# Patient Record
Sex: FEMALE | Race: WHITE | NOT HISPANIC OR LATINO | URBAN - METROPOLITAN AREA
[De-identification: names, ages, dates, MRNs, and addresses within clinical notes are randomized per-mention and may not be internally consistent; named-entity substitution may affect disease eponyms.]

---

## 2024-04-04 ENCOUNTER — LAB VISIT (OUTPATIENT)
Dept: LAB | Facility: HOSPITAL | Age: 24
End: 2024-04-04
Attending: FAMILY MEDICINE
Payer: COMMERCIAL

## 2024-04-04 ENCOUNTER — OFFICE VISIT (OUTPATIENT)
Dept: FAMILY MEDICINE | Facility: CLINIC | Age: 24
End: 2024-04-04
Payer: COMMERCIAL

## 2024-04-04 ENCOUNTER — TELEPHONE (OUTPATIENT)
Dept: DIABETES | Facility: CLINIC | Age: 24
End: 2024-04-04
Payer: COMMERCIAL

## 2024-04-04 VITALS
DIASTOLIC BLOOD PRESSURE: 83 MMHG | HEIGHT: 63 IN | SYSTOLIC BLOOD PRESSURE: 127 MMHG | OXYGEN SATURATION: 97 % | WEIGHT: 141.44 LBS | BODY MASS INDEX: 25.06 KG/M2 | TEMPERATURE: 99 F | HEART RATE: 105 BPM

## 2024-04-04 DIAGNOSIS — E10.22 TYPE 1 DIABETES MELLITUS WITH STAGE 2 CHRONIC KIDNEY DISEASE: ICD-10-CM

## 2024-04-04 DIAGNOSIS — N18.2 TYPE 1 DIABETES MELLITUS WITH STAGE 2 CHRONIC KIDNEY DISEASE: Primary | ICD-10-CM

## 2024-04-04 DIAGNOSIS — E10.22 TYPE 1 DIABETES MELLITUS WITH STAGE 2 CHRONIC KIDNEY DISEASE: Primary | ICD-10-CM

## 2024-04-04 DIAGNOSIS — N18.2 TYPE 1 DIABETES MELLITUS WITH STAGE 2 CHRONIC KIDNEY DISEASE: ICD-10-CM

## 2024-04-04 LAB
ANION GAP SERPL CALC-SCNC: 11 MMOL/L (ref 8–16)
BUN SERPL-MCNC: 12 MG/DL (ref 6–20)
CALCIUM SERPL-MCNC: 9.9 MG/DL (ref 8.7–10.5)
CHLORIDE SERPL-SCNC: 102 MMOL/L (ref 95–110)
CO2 SERPL-SCNC: 23 MMOL/L (ref 23–29)
CREAT SERPL-MCNC: 0.8 MG/DL (ref 0.5–1.4)
EST. GFR  (NO RACE VARIABLE): >60 ML/MIN/1.73 M^2
ESTIMATED AVG GLUCOSE: 263 MG/DL (ref 68–131)
GLUCOSE SERPL-MCNC: 276 MG/DL (ref 70–110)
HBA1C MFR BLD: 10.8 % (ref 4–5.6)
POTASSIUM SERPL-SCNC: 4.9 MMOL/L (ref 3.5–5.1)
SODIUM SERPL-SCNC: 136 MMOL/L (ref 136–145)

## 2024-04-04 PROCEDURE — 99999 PR PBB SHADOW E&M-NEW PATIENT-LVL V: CPT | Mod: PBBFAC,,, | Performed by: FAMILY MEDICINE

## 2024-04-04 PROCEDURE — 1159F MED LIST DOCD IN RCRD: CPT | Mod: CPTII,S$GLB,, | Performed by: FAMILY MEDICINE

## 2024-04-04 PROCEDURE — 3074F SYST BP LT 130 MM HG: CPT | Mod: CPTII,S$GLB,, | Performed by: FAMILY MEDICINE

## 2024-04-04 PROCEDURE — 4010F ACE/ARB THERAPY RXD/TAKEN: CPT | Mod: CPTII,S$GLB,, | Performed by: FAMILY MEDICINE

## 2024-04-04 PROCEDURE — 99204 OFFICE O/P NEW MOD 45 MIN: CPT | Mod: S$GLB,,, | Performed by: FAMILY MEDICINE

## 2024-04-04 PROCEDURE — 36415 COLL VENOUS BLD VENIPUNCTURE: CPT | Mod: PO | Performed by: FAMILY MEDICINE

## 2024-04-04 PROCEDURE — 80048 BASIC METABOLIC PNL TOTAL CA: CPT | Performed by: FAMILY MEDICINE

## 2024-04-04 PROCEDURE — 3079F DIAST BP 80-89 MM HG: CPT | Mod: CPTII,S$GLB,, | Performed by: FAMILY MEDICINE

## 2024-04-04 PROCEDURE — 3046F HEMOGLOBIN A1C LEVEL >9.0%: CPT | Mod: CPTII,S$GLB,, | Performed by: FAMILY MEDICINE

## 2024-04-04 PROCEDURE — 3008F BODY MASS INDEX DOCD: CPT | Mod: CPTII,S$GLB,, | Performed by: FAMILY MEDICINE

## 2024-04-04 PROCEDURE — 83036 HEMOGLOBIN GLYCOSYLATED A1C: CPT | Performed by: FAMILY MEDICINE

## 2024-04-04 RX ORDER — LOSARTAN POTASSIUM 25 MG/1
25 TABLET ORAL DAILY
COMMUNITY

## 2024-04-04 RX ORDER — ATORVASTATIN CALCIUM 80 MG/1
40 TABLET, FILM COATED ORAL DAILY
COMMUNITY
Start: 2023-07-23

## 2024-04-04 RX ORDER — INSULIN LISPRO 100 [IU]/ML
INJECTION, SOLUTION INTRAVENOUS; SUBCUTANEOUS
COMMUNITY
Start: 2023-10-25 | End: 2024-05-16 | Stop reason: SDUPTHER

## 2024-04-04 RX ORDER — PEN NEEDLE, DIABETIC 32GX 5/32"
NEEDLE, DISPOSABLE MISCELLANEOUS
COMMUNITY
Start: 2024-01-18

## 2024-04-04 RX ORDER — ERGOCALCIFEROL 1.25 MG/1
50000 CAPSULE ORAL
COMMUNITY

## 2024-04-04 RX ORDER — METOPROLOL SUCCINATE 50 MG/1
50 TABLET, EXTENDED RELEASE ORAL 2 TIMES DAILY
COMMUNITY

## 2024-04-04 RX ORDER — GLUCAGON 3 MG/1
POWDER NASAL
COMMUNITY

## 2024-04-04 RX ORDER — CETIRIZINE HYDROCHLORIDE 10 MG/1
1 TABLET ORAL EVERY MORNING
COMMUNITY

## 2024-04-04 RX ORDER — BLOOD-GLUCOSE SENSOR
EACH MISCELLANEOUS
COMMUNITY
Start: 2024-01-18 | End: 2024-05-27

## 2024-04-04 RX ORDER — GLUCOSAM/CHON-MSM1/C/MANG/BOSW 500-416.6
TABLET ORAL
COMMUNITY
Start: 2024-02-11

## 2024-04-04 RX ORDER — INSULIN GLARGINE 100 [IU]/ML
26 INJECTION, SOLUTION SUBCUTANEOUS DAILY
COMMUNITY
Start: 2024-01-18 | End: 2024-05-15 | Stop reason: SDUPTHER

## 2024-04-04 RX ORDER — LANSOPRAZOLE 30 MG/1
30 CAPSULE, DELAYED RELEASE ORAL DAILY
COMMUNITY
Start: 2024-01-18 | End: 2024-07-16

## 2024-04-04 RX ORDER — LANCETS
EACH MISCELLANEOUS
COMMUNITY
Start: 2024-01-18

## 2024-04-04 NOTE — TELEPHONE ENCOUNTER
Spoke with patients mother to assist with scheduling a new patient appointment with diabetes management

## 2024-04-04 NOTE — PROGRESS NOTES
"Subjective:       Patient ID: Gracie Irizarry is a 24 y.o. female.    Chief Complaint: Establish Care      HPI Comments:       Current Outpatient Medications:     atorvastatin (LIPITOR) 80 MG tablet, Take 40 mg by mouth once daily., Disp: , Rfl:     BAQSIMI 3 mg/actuation Spry, SMARTSI Spray(s) Both Nares PRN, Disp: , Rfl:     BD NAZIA 2ND GEN PEN NEEDLE 32 gauge x 5/32" Ndle, Use needles for insulin administration., Disp: , Rfl:     blood sugar diagnostic Strp, True metrix test strips. Tests 5 times daily, Disp: , Rfl:     blood-glucose sensor (DEXCOM G7 SENSOR) Nadia, Please use sensor wit Dexcom CGM., Disp: , Rfl:     cetirizine (ZYRTEC) 10 MG tablet, Take 1 tablet by mouth every morning., Disp: , Rfl:     ergocalciferol (ERGOCALCIFEROL) 50,000 unit Cap, Take 50,000 Units by mouth every 7 days., Disp: , Rfl:     HUMALOG KWIKPEN INSULIN 100 unit/mL pen, PLEASE SEE ATTACHED FOR DETAILED DIRECTIONS, Disp: , Rfl:     lancets Misc, ONE TOUCH ULTRA LANCETS, TESTS 5 TIMES DAILY, Disp: , Rfl:     lansoprazole (PREVACID) 30 MG capsule, Take 30 mg by mouth once daily., Disp: , Rfl:     LANTUS SOLOSTAR U-100 INSULIN glargine 100 units/mL SubQ pen, Inject 26 Units into the skin once daily., Disp: , Rfl:     losartan (COZAAR) 25 MG tablet, Take 25 mg by mouth once daily., Disp: , Rfl:     metoprolol succinate (TOPROL-XL) 50 MG 24 hr tablet, Take 50 mg by mouth 2 (two) times daily., Disp: , Rfl:     TRUEPLUS LANCETS 30 gauge Misc, TESTS 5 TIMES DAILY, Disp: , Rfl:       Here to establish care.  Accompanied by her mother.  Lives nearby    Some recent disruption in her care due to insurance problems.  She is now on her mother's insurance.  Does not have anybody in diabetes care that is following her currently.  Needs some Dexcom parts to get back to fully monitoring her sugar.  Currently her blood sugar runs in the 100s but sometimes up to 479.  Recent admission for DKA back in February    She feels well.  No polydipsia , " "polyuria.    Histor of subdural hematoma found in October '23.  No residual neurologic symptoms or headaches    Status post cholecystectomy.  Appetite good      Review of Systems   Constitutional:  Negative for activity change, appetite change and fever.   HENT:  Negative for sore throat.    Respiratory:  Negative for cough and shortness of breath.    Cardiovascular:  Negative for chest pain.   Gastrointestinal:  Negative for abdominal pain, diarrhea and nausea.   Endocrine: Negative for polydipsia and polyphagia.   Genitourinary:  Negative for difficulty urinating.   Musculoskeletal:  Negative for arthralgias and myalgias.   Neurological:  Negative for dizziness and headaches.       Objective:      Vitals:    04/04/24 1326   BP: 127/83   Pulse: 105   Temp: 99.1 °F (37.3 °C)   SpO2: 97%   Weight: 64.1 kg (141 lb 6.8 oz)   Height: 5' 3" (1.6 m)   PainSc: 0-No pain     Physical Exam  Vitals and nursing note reviewed.   Constitutional:       General: She is not in acute distress.     Appearance: She is well-developed. She is not diaphoretic.   HENT:      Head: Normocephalic.   Neck:      Thyroid: No thyromegaly.   Cardiovascular:      Rate and Rhythm: Normal rate and regular rhythm.      Heart sounds: Normal heart sounds. No murmur heard.  Pulmonary:      Effort: Pulmonary effort is normal.      Breath sounds: Normal breath sounds. No wheezing or rales.   Abdominal:      General: There is no distension.      Palpations: Abdomen is soft.   Musculoskeletal:      Cervical back: Neck supple.   Lymphadenopathy:      Cervical: No cervical adenopathy.   Skin:     General: Skin is warm and dry.   Neurological:      Mental Status: She is alert and oriented to person, place, and time.   Psychiatric:         Mood and Affect: Mood normal.         Behavior: Behavior normal.         Thought Content: Thought content normal.         Judgment: Judgment normal.         Assessment:       1. Type 1 diabetes mellitus with stage 2 chronic " kidney disease        Plan:   Type 1 diabetes mellitus with stage 2 chronic kidney disease  Comments:  Continue current meds.  Diabetes care consult.  Follow-up 3 months  Orders:  -     Ambulatory referral/consult to Diabetic Advanced Practice Providers (Medical Management); Future; Expected date: 04/11/2024  -     Basic Metabolic Panel; Future; Expected date: 04/04/2024  -     Hemoglobin A1C; Future; Expected date: 04/04/2024

## 2024-04-10 DIAGNOSIS — E11.9 TYPE 2 DIABETES MELLITUS WITHOUT COMPLICATION: ICD-10-CM

## 2024-05-14 ENCOUNTER — PATIENT MESSAGE (OUTPATIENT)
Dept: FAMILY MEDICINE | Facility: CLINIC | Age: 24
End: 2024-05-14
Payer: COMMERCIAL

## 2024-05-14 RX ORDER — INSULIN LISPRO 100 [IU]/ML
INJECTION, SOLUTION INTRAVENOUS; SUBCUTANEOUS
Refills: 0 | OUTPATIENT
Start: 2024-05-14

## 2024-05-14 NOTE — TELEPHONE ENCOUNTER
Refill Decision Note   Gracie Irizarry  is requesting a refill authorization.  Brief Assessment and Rationale for Refill:  Quick Discontinue     Medication Therapy Plan:   . Pharmacy is requesting new scripts for the following medications without required information, (sig/ frequency/qty/etc)          Comments: Pharmacies have been requesting medications for patients without required information, (sig, frequency, qty, etc.). In addition, requests are sent for medication(s) pt. are currently not taking, and medications patients have never taken.    We have spoken to the pharmacies about these request types and advised their teams previously that we are unable to assess these New Script requests and require all details for these requests. This is a known issue and has been reported.     Note composed:6:12 PM 05/14/2024

## 2024-05-14 NOTE — TELEPHONE ENCOUNTER
No care due was identified.  [facility] Embedded Care Due Messages. Reference number: [id number].   5/14/2024 5:31:03 PM CDT

## 2024-05-15 DIAGNOSIS — N18.2 TYPE 1 DIABETES MELLITUS WITH STAGE 2 CHRONIC KIDNEY DISEASE: ICD-10-CM

## 2024-05-15 DIAGNOSIS — E10.22 TYPE 1 DIABETES MELLITUS WITH STAGE 2 CHRONIC KIDNEY DISEASE: ICD-10-CM

## 2024-05-15 RX ORDER — INSULIN GLARGINE 100 [IU]/ML
26 INJECTION, SOLUTION SUBCUTANEOUS DAILY
Qty: 3 ML | Refills: 3 | Status: SHIPPED | OUTPATIENT
Start: 2024-05-15

## 2024-05-16 DIAGNOSIS — N18.2 TYPE 1 DIABETES MELLITUS WITH STAGE 2 CHRONIC KIDNEY DISEASE: ICD-10-CM

## 2024-05-16 DIAGNOSIS — E10.22 TYPE 1 DIABETES MELLITUS WITH STAGE 2 CHRONIC KIDNEY DISEASE: ICD-10-CM

## 2024-05-16 RX ORDER — INSULIN LISPRO 100 [IU]/ML
INJECTION, SOLUTION INTRAVENOUS; SUBCUTANEOUS
Qty: 3 ML | Refills: 5 | Status: SHIPPED | OUTPATIENT
Start: 2024-05-16 | End: 2024-05-17

## 2024-05-16 NOTE — TELEPHONE ENCOUNTER
No care due was identified.  [facility] Embedded Care Due Messages. Reference number: [id number].   5/16/2024 2:08:41 PM CDT

## 2024-05-17 DIAGNOSIS — N18.2 TYPE 1 DIABETES MELLITUS WITH STAGE 2 CHRONIC KIDNEY DISEASE: ICD-10-CM

## 2024-05-17 DIAGNOSIS — E10.22 TYPE 1 DIABETES MELLITUS WITH STAGE 2 CHRONIC KIDNEY DISEASE: ICD-10-CM

## 2024-05-17 RX ORDER — INSULIN LISPRO 100 [IU]/ML
INJECTION, SOLUTION INTRAVENOUS; SUBCUTANEOUS
Qty: 15 EACH | Refills: 5 | Status: SHIPPED | OUTPATIENT
Start: 2024-05-17 | End: 2024-06-17

## 2024-05-17 NOTE — TELEPHONE ENCOUNTER
Refill Routing Note   Medication(s) are not appropriate for processing by [facility] for the following reason(s):        Outside of protocol    [facility] action(s):  Route        Medication Therapy Plan: Insulin on a sliding scale is outside of [facility] protocol      Appointments  past 12m or future 3m with PCP    Date Provider   Last Visit   4/4/2024 Kobi Martin MD   Next Visit   7/5/2024 Kobi Martin MD   ED visits in past 90 days: 0        Note composed:9:56 PM 05/16/2024

## 2024-05-17 NOTE — TELEPHONE ENCOUNTER
Spoke with pharmacy they stated patient prescription was not sent in correctly and need a new prescription.

## 2024-05-17 NOTE — TELEPHONE ENCOUNTER
----- Message from Royce Moran sent at 5/17/2024  4:27 PM CDT -----  Contact: Victorina / EDY Prajapati is calling to clarify the script for Jovanna Oliveira. Please call Victorina at  [phone].

## 2024-05-17 NOTE — TELEPHONE ENCOUNTER
No care due was identified.  Health [facility] Embedded Care Due Messages. Reference number: [id number].   5/17/2024 4:49:36 PM CDT

## 2024-05-19 RX ORDER — INSULIN LISPRO 100 [IU]/ML
INJECTION, SOLUTION INTRAVENOUS; SUBCUTANEOUS
Qty: 15 EACH | Refills: 5 | OUTPATIENT
Start: 2024-05-19

## 2024-05-20 ENCOUNTER — TELEPHONE (OUTPATIENT)
Dept: FAMILY MEDICINE | Facility: CLINIC | Age: 24
End: 2024-05-20
Payer: COMMERCIAL

## 2024-05-20 NOTE — TELEPHONE ENCOUNTER
----- Message from Kobi Martin MD sent at 5/20/2024  8:32 AM CDT -----  Contact: Victorina / EDY  Please see if you can get this resolved  ----- Message -----  From: Danuta Lacey CMA  Sent: 5/17/2024   4:46 PM CDT  To: Kobi Martin MD    Prescription needs more clarification pharmacy will not refill.  ----- Message -----  From: Royce Moran  Sent: 5/17/2024   4:28 PM CDT  To: Veronica Prajapati is calling to clarify the script for Humalog Pen. Please call Victorina at  [phone].

## 2024-05-20 NOTE — TELEPHONE ENCOUNTER
Called pharmacy to clarify medication was not able to due to no information specified regarding that. Called pt to clarify medication unit. Patient did not answer left a voicemail to call back.

## 2024-05-21 NOTE — TELEPHONE ENCOUNTER
Spoke to Olamide at the pharmacy and gave dosage for humalog per pt message and she verbalized understanding

## 2024-05-24 ENCOUNTER — OFFICE VISIT (OUTPATIENT)
Dept: FAMILY MEDICINE | Facility: CLINIC | Age: 24
End: 2024-05-24
Payer: COMMERCIAL

## 2024-05-24 VITALS
OXYGEN SATURATION: 97 % | SYSTOLIC BLOOD PRESSURE: 130 MMHG | WEIGHT: 137.38 LBS | BODY MASS INDEX: 24.33 KG/M2 | TEMPERATURE: 98 F | HEART RATE: 115 BPM | DIASTOLIC BLOOD PRESSURE: 88 MMHG

## 2024-05-24 DIAGNOSIS — N18.2 TYPE 1 DIABETES MELLITUS WITH STAGE 2 CHRONIC KIDNEY DISEASE: ICD-10-CM

## 2024-05-24 DIAGNOSIS — E10.22 TYPE 1 DIABETES MELLITUS WITH STAGE 2 CHRONIC KIDNEY DISEASE: ICD-10-CM

## 2024-05-24 DIAGNOSIS — L98.499 SKIN ULCER, UNSPECIFIED ULCER STAGE: Primary | ICD-10-CM

## 2024-05-24 LAB
ALBUMIN/CREAT UR: 26.5 UG/MG (ref 0–30)
CREAT UR-MCNC: 49 MG/DL (ref 15–325)
MICROALBUMIN UR DL<=1MG/L-MCNC: 13 UG/ML

## 2024-05-24 PROCEDURE — 99999 PR PBB SHADOW E&M-EST. PATIENT-LVL V: CPT | Mod: PBBFAC,,, | Performed by: FAMILY MEDICINE

## 2024-05-24 PROCEDURE — 4010F ACE/ARB THERAPY RXD/TAKEN: CPT | Mod: CPTII,S$GLB,, | Performed by: FAMILY MEDICINE

## 2024-05-24 PROCEDURE — 82043 UR ALBUMIN QUANTITATIVE: CPT | Performed by: FAMILY MEDICINE

## 2024-05-24 PROCEDURE — 3008F BODY MASS INDEX DOCD: CPT | Mod: CPTII,S$GLB,, | Performed by: FAMILY MEDICINE

## 2024-05-24 PROCEDURE — 3079F DIAST BP 80-89 MM HG: CPT | Mod: CPTII,S$GLB,, | Performed by: FAMILY MEDICINE

## 2024-05-24 PROCEDURE — 99214 OFFICE O/P EST MOD 30 MIN: CPT | Mod: S$GLB,,, | Performed by: FAMILY MEDICINE

## 2024-05-24 PROCEDURE — 3075F SYST BP GE 130 - 139MM HG: CPT | Mod: CPTII,S$GLB,, | Performed by: FAMILY MEDICINE

## 2024-05-24 PROCEDURE — 3046F HEMOGLOBIN A1C LEVEL >9.0%: CPT | Mod: CPTII,S$GLB,, | Performed by: FAMILY MEDICINE

## 2024-05-24 PROCEDURE — 1159F MED LIST DOCD IN RCRD: CPT | Mod: CPTII,S$GLB,, | Performed by: FAMILY MEDICINE

## 2024-05-24 NOTE — PROGRESS NOTES
"Subjective:       Patient ID: Gracie Irizarry is a 24 y.o. female.    Chief Complaint: Wound Check      HPI Comments:       Current Outpatient Medications:     atorvastatin (LIPITOR) 80 MG tablet, Take 40 mg by mouth once daily., Disp: , Rfl:     BAQSIMI 3 mg/actuation Spry, SMARTSI Spray(s) Both Nares PRN, Disp: , Rfl:     BD NAZIA 2ND GEN PEN NEEDLE 32 gauge x 5/32" Ndle, Use needles for insulin administration., Disp: , Rfl:     blood sugar diagnostic Strp, True metrix test strips. Tests 5 times daily, Disp: , Rfl:     blood-glucose sensor (DEXCOM G7 SENSOR) Nadia, Please use sensor wit Dexcom CGM., Disp: , Rfl:     cetirizine (ZYRTEC) 10 MG tablet, Take 1 tablet by mouth every morning., Disp: , Rfl:     ergocalciferol (ERGOCALCIFEROL) 50,000 unit Cap, Take 50,000 Units by mouth every 7 days., Disp: , Rfl:     HUMALOG KWIKPEN INSULIN 100 unit/mL pen, TO BE USED BY SLIDING SCALE 3 TIMES A DAY FOR GLUCOSE CONTROL, Disp: 15 each, Rfl: 5    lancets Misc, ONE TOUCH ULTRA LANCETS, TESTS 5 TIMES DAILY, Disp: , Rfl:     lansoprazole (PREVACID) 30 MG capsule, Take 30 mg by mouth once daily., Disp: , Rfl:     LANTUS SOLOSTAR U-100 INSULIN 100 unit/mL (3 mL) InPn pen, Inject 26 Units into the skin once daily., Disp: 3 mL, Rfl: 3    losartan (COZAAR) 25 MG tablet, Take 25 mg by mouth once daily., Disp: , Rfl:     metoprolol succinate (TOPROL-XL) 50 MG 24 hr tablet, Take 50 mg by mouth 2 (two) times daily., Disp: , Rfl:     TRUEPLUS LANCETS 30 gauge Misc, TESTS 5 TIMES DAILY, Disp: , Rfl:       Shoes your rubbing on her ankle and she developed a blister a few days ago.  It has since drained and now she has a ulcer with an eschar.  No pain.  No fever chills.  No drainage or fluctuation    Blood pressure has been high in the past.  She is on losartan for proteinuria    Needs a gynecologist for Pap smear.      Review of Systems   Constitutional:  Negative for activity change, appetite change and fever.   HENT:  Negative for sore " throat.    Respiratory:  Negative for cough and shortness of breath.    Cardiovascular:  Negative for chest pain.   Gastrointestinal:  Negative for abdominal pain, diarrhea and nausea.   Genitourinary:  Negative for difficulty urinating.   Musculoskeletal:  Negative for arthralgias and myalgias.   Skin:  Positive for wound.   Neurological:  Negative for dizziness and headaches.       Objective:      Vitals:    05/24/24 0805 05/24/24 0814   BP: (!) 141/94 130/88   Pulse: (!) 115    Temp: 98.3 °F (36.8 °C)    TempSrc: Oral    SpO2: 97%    Weight: 62.3 kg (137 lb 5.6 oz)    PainSc: 0-No pain      Physical Exam  Constitutional:       Appearance: She is not ill-appearing.   HENT:      Head: Normocephalic.   Cardiovascular:      Rate and Rhythm: Tachycardia present.   Pulmonary:      Effort: Pulmonary effort is normal. No respiratory distress.   Musculoskeletal:      Cervical back: Neck supple.      Right lower leg: No edema.      Left lower leg: No edema.        Feet:    Feet:      Comments: Shallow ulcer with eschar.  No fluctuation or induration.  No erythema or tenderness  Neurological:      Mental Status: She is alert and oriented to person, place, and time.   Psychiatric:         Mood and Affect: Mood normal.         Behavior: Behavior normal.         Thought Content: Thought content normal.         Judgment: Judgment normal.         Assessment:       1. Skin ulcer, unspecified ulcer stage    2. Type 1 diabetes mellitus with stage 2 chronic kidney disease        Plan:   Skin ulcer, unspecified ulcer stage  Comments:  Wound care clinic consult  Orders:  -     Ambulatory referral/consult to Wound Clinic; Future; Expected date: 05/31/2024    Type 1 diabetes mellitus with stage 2 chronic kidney disease  Comments:  Uncontrolled.  Sees diabetes next week.  Podiatry consult for foot exam  Orders:  -     Ambulatory referral/consult to Podiatry; Future; Expected date: 05/31/2024  -     Microalbumin/Creatinine Ratio, Urine;  Future; Expected date: 05/24/2024  -     Ambulatory referral/consult to Gynecology; Future; Expected date: 05/31/2024

## 2024-05-27 ENCOUNTER — OFFICE VISIT (OUTPATIENT)
Dept: DIABETES | Facility: CLINIC | Age: 24
End: 2024-05-27
Payer: COMMERCIAL

## 2024-05-27 DIAGNOSIS — I10 ESSENTIAL HYPERTENSION: ICD-10-CM

## 2024-05-27 DIAGNOSIS — E10.69 DYSLIPIDEMIA DUE TO TYPE 1 DIABETES MELLITUS: ICD-10-CM

## 2024-05-27 DIAGNOSIS — E10.21 DIABETIC NEPHROPATHY ASSOCIATED WITH TYPE 1 DIABETES MELLITUS: ICD-10-CM

## 2024-05-27 DIAGNOSIS — K21.9 GASTROESOPHAGEAL REFLUX DISEASE WITHOUT ESOPHAGITIS: ICD-10-CM

## 2024-05-27 DIAGNOSIS — E78.5 DYSLIPIDEMIA DUE TO TYPE 1 DIABETES MELLITUS: ICD-10-CM

## 2024-05-27 DIAGNOSIS — N18.2 TYPE 1 DIABETES MELLITUS WITH STAGE 2 CHRONIC KIDNEY DISEASE: ICD-10-CM

## 2024-05-27 DIAGNOSIS — E10.22 TYPE 1 DIABETES MELLITUS WITH STAGE 2 CHRONIC KIDNEY DISEASE: ICD-10-CM

## 2024-05-27 DIAGNOSIS — E10.65 TYPE 1 DIABETES MELLITUS WITH HYPERGLYCEMIA: Primary | ICD-10-CM

## 2024-05-27 DIAGNOSIS — E34.8: ICD-10-CM

## 2024-05-27 PROBLEM — E10.319 DIABETIC RETINOPATHY ASSOCIATED WITH TYPE 1 DIABETES MELLITUS: Status: ACTIVE | Noted: 2018-05-18

## 2024-05-27 PROBLEM — E10.11 DIABETIC KETOACIDOSIS WITH COMA ASSOCIATED WITH TYPE 1 DIABETES MELLITUS: Status: ACTIVE | Noted: 2018-11-28

## 2024-05-27 PROCEDURE — 3061F NEG MICROALBUMINURIA REV: CPT | Mod: CPTII,95,, | Performed by: NURSE PRACTITIONER

## 2024-05-27 PROCEDURE — 3066F NEPHROPATHY DOC TX: CPT | Mod: CPTII,95,, | Performed by: NURSE PRACTITIONER

## 2024-05-27 PROCEDURE — 99204 OFFICE O/P NEW MOD 45 MIN: CPT | Mod: 95,,, | Performed by: NURSE PRACTITIONER

## 2024-05-27 PROCEDURE — 4010F ACE/ARB THERAPY RXD/TAKEN: CPT | Mod: CPTII,95,, | Performed by: NURSE PRACTITIONER

## 2024-05-27 PROCEDURE — 3046F HEMOGLOBIN A1C LEVEL >9.0%: CPT | Mod: CPTII,95,, | Performed by: NURSE PRACTITIONER

## 2024-05-27 RX ORDER — BLOOD-GLUCOSE SENSOR
1 EACH MISCELLANEOUS
Qty: 3 EACH | Refills: 11 | Status: SHIPPED | OUTPATIENT
Start: 2024-05-27 | End: 2024-06-17

## 2024-05-27 NOTE — PROGRESS NOTES
Telemedicine Visit    The patient location is home  The chief complaint leading to consultation is: Diabetes    Visit type: audiovisual    Face to Face time with patient: 30  40 minutes of total time spent on the encounter, which includes face to face time and non-face to face time preparing to see the patient (eg, review of tests), Obtaining and/or reviewing separately obtained history, Documenting clinical information in the electronic or other health record, Independently interpreting results (not separately reported) and communicating results to the patient/family/caregiver, or Care coordination (not separately reported).  Each patient to whom he or she provides medical services by telemedicine is:  (1) informed of the relationship between the physician and patient and the respective role of any other health care provider with respect to management of the patient; and (2) notified that he or she may decline to receive medical services by telemedicine and may withdraw from such care at any time.  Notes:         Gracie Irizarry is a 24 y.o. female who  has a past medical history of Diabetes mellitus type I and GERD (gastroesophageal reflux disease)., who present for an initial evaluation of Type 1 diabetes mellitus.     CHIEF COMPLAINT: Diabetes Consultation    PCP: Kobi Martin MD     The patient was initially diagnosed with diabetes at age 7.   Followed by Dr. Jayshree barnett prior to aging out of peds clinic.   Patient seen once by Dr. Jasmine at [facility] in February of 2023 which was last encounter with an endocrinologist.   Numerous admission for DKA.   Never been on a insulin pump.    Previous failed treatments include:  Dexcom G6 CGM - was upgraded to G7, then fell out of care with last provider. Has not had in some time.    Social Documentation:  Patient lives in [place] with mom and dad.   Occupation: works at a day care. Working 7am-6pm M-F.  Exercise:     Current monitoring regimen:  "capillary blood glucose monitoring with finger sticks.   Currently checking before meals and at bedtime, occasionally at 3 am if she wakes up.   Overall elevated, but states will stabilize out 150-200 before lunch.     Current Diabetes related symptoms/problems include hyperglycemia and have been unchanged.     Diabetes related complications:   nephropathy, retinopathy, and autonomic neuropathy, Mauriac syndrome  denies Pancreatitis  denies Gastroparesis  reports DKA  denies Hx/family Hx of MEN2/MTC  denies Frequent UTIs/yeast infections    Cardiovascular Risk Factors: dyslipidemia, hypertension, and microalbuminuria.    Any episodes of hypoglycemia?  Yes. Hypoglycemia treatment reviewed with patient and education to be provided in AVS.   Hypoglycemia Unawareness? No  Severe hypoglycemia requiring 3rd party? Yes - once at age 10.     Last visit with Diabetes Education: none in last year.    Diabetes Medications               BAQSIMI 3 mg/actuation Spry SMARTSI Spray(s) Both Nares PRN    HUMALOG KWIKPEN INSULIN 100 unit/mL pen TO BE USED BY SLIDING SCALE 3 TIMES A DAY FOR GLUCOSE CONTROL - Target 150, ISF 40. Set dose of 6 units plus correction with meals.     LANTUS SOLOSTAR U-100 INSULIN 100 unit/mL (3 mL) InPn pen Inject 26 Units into the skin once daily. - Taking 26 units qhs.      DIABETES DISEASE MANAGEMENT STATUS  Statin: Taking  ACE/ARB: Taking  Screening or Prevention Patient's value Goal Complete/Controlled?   HgA1C Testing and Control   Lab Results   Component Value Date    HGBA1C 10.8 (H) 2024      Annually/Less than 8% No   Lipid profile : 2023 Annually No   LDL control Lab Results   Component Value Date    LDLCALC 81 2023    Annually/Less than 100 mg/dl  Yes   Nephropathy screening Lab Results   Component Value Date    LABMICR 13.0 2024     No results found for: "PROTEINUA"  No results found for: "UTPCR"   Annually Yes   Blood pressure BP Readings from Last 1 Encounters: " "  05/24/24 130/88    Less than 140/90 Yes   Dilated retinal exam : 09/23/2022 Annually No   Foot exam   Most Recent Foot Exam Date: Not Found Annually No   Patient's medications, allergies, past medical, surgical, social and family histories were reviewed and updated as appropriate.     Review of Systems   Constitutional:  Negative for weight loss.   Eyes:  Negative for blurred vision and double vision.   Cardiovascular:  Negative for chest pain.   Gastrointestinal:  Negative for nausea and vomiting.   Genitourinary:  Negative for frequency.   Musculoskeletal:  Negative for falls.   Neurological:  Negative for dizziness and weakness.   Endo/Heme/Allergies:  Negative for polydipsia.   Psychiatric/Behavioral:  Negative for depression.    All other systems reviewed and are negative.       Physical Exam  Constitutional:       Appearance: Normal appearance.   HENT:      Head: Normocephalic and atraumatic.   Pulmonary:      Effort: No respiratory distress.   Musculoskeletal:      Cervical back: Normal range of motion.   Neurological:      Mental Status: She is alert and oriented to person, place, and time.   Psychiatric:         Mood and Affect: Mood normal.         Behavior: Behavior normal.        Last menstrual period 03/31/2024.  Wt Readings from Last 3 Encounters:   05/24/24 62.3 kg (137 lb 5.6 oz)   04/04/24 64.1 kg (141 lb 6.8 oz)       LAB REVIEW  Lab Results   Component Value Date     04/04/2024    K 4.9 04/04/2024     04/04/2024    CO2 23 04/04/2024    BUN 12 04/04/2024    CREATININE 0.8 04/04/2024    CALCIUM 9.9 04/04/2024    ANIONGAP 11 04/04/2024    EGFRNORACEVR >60.0 04/04/2024     No results found for: "CPEPTIDE", "GLUTAMICACID", "INSLNABS"  Hemoglobin A1C   Date Value Ref Range Status   04/04/2024 10.8 (H) 4.0 - 5.6 % Final     Comment:     ADA Screening Guidelines:  5.7-6.4%  Consistent with prediabetes  >or=6.5%  Consistent with diabetes    High levels of fetal hemoglobin interfere with the " HbA1C  assay. Heterozygous hemoglobin variants (HbS, HgC, etc)do  not significantly interfere with this assay.   However, presence of multiple variants may affect accuracy.     01/10/2024 11.2 (H) <=6.5 % Final   09/15/2023 10.1 (H) 4.8 - 5.6 % Final     Comment:              Prediabetes: 5.7 - 6.4           Diabetes: >6.4           Glycemic control for adults with diabetes: <7.0   07/13/2023 9.7 (H) 4.8 - 5.6 % Final     Comment:              Prediabetes: 5.7 - 6.4           Diabetes: >6.4           Glycemic control for adults with diabetes: <7.0       ASSESSMENT    ICD-10-CM ICD-9-CM   1. 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 Gastroesophageal reflux disease without esophagitis  K21.9 530.81   4. Diabetic nephropathy associated with type 1 diabetes mellitus  E10.21 250.41     583.81   5. Dyslipidemia due to type 1 diabetes mellitus  E10.69 250.81    E78.5 272.4   6. Essential hypertension  I10 401.9   7. Type 1 diabetes mellitus with stage 2 chronic kidney disease  E10.22 250.41    N18.2 585.2        PLAN  Diagnoses and all orders for this visit:    Type 1 diabetes mellitus with hyperglycemia  -     Ambulatory referral/consult to Diabetes Education; Future  -     blood-glucose sensor (DEXCOM G7 SENSOR) Nadia; 1 Device by Misc.(Non-Drug; Combo Route) route every 10 days.    Mauriac syndrome    Gastroesophageal reflux disease without esophagitis    Diabetic nephropathy associated with type 1 diabetes mellitus    Dyslipidemia due to type 1 diabetes mellitus    Essential hypertension    Type 1 diabetes mellitus with stage 2 chronic kidney disease  Comments:  Continue current meds.  Diabetes care consult.  Follow-up 3 months  Orders:  -     Ambulatory referral/consult to Diabetic Advanced Practice Providers (Medical Management)        Reviewed pathophysiology of diabetes, complications related to the disease, importance of annual dilated eye exam and daily foot  examination. Explained MOA, SE, dosage of medications. Written instructions given and reviewed with patient and patient verbalizes understanding.     PATIENT INSTRUCTIONS    You are overdue for your yearly diabetic eye exam. Please schedule an appointment with your eye care provider at your earliest convenience. If your eye care provider is outside of the [facility] system, please have them fax a report of the exam to [facility] Fax [phone].    Refer to diabetes education.  - Comprehensive with Pump Evaluation.     Continue Lantus 26 units subcutaneously daily at bedtime.   Continue Humalog 6 units subcutaneously before meals plus correction dosing.   Continue correction dosing every 4 hours outside of meal times using correction dosing.   Target 150, ISF 40.     G7 CGM sent to pharmacy.   Blood Sugar Goals:       Fastin-130.       1-2 hours after a meal: Less than 180.   Follow up in about 3 weeks (around 2024) for Virtual, Dexcom.    Portions of this note were prepared with [employer] voice recognition transcription software. Grammatical errors, including garbled syntax, mangle pronouns, and other bizarre constructions may be attributed to that software system.

## 2024-05-27 NOTE — PATIENT INSTRUCTIONS
PATIENT INSTRUCTIONS    You are overdue for your yearly diabetic eye exam. Please schedule an appointment with your eye care provider at your earliest convenience. If your eye care provider is outside of the [facility] system, please have them fax a report of the exam to [facility] Fax [phone].    Refer to diabetes education.  - Comprehensive with Pump Evaluation.     Continue Lantus 26 units subcutaneously daily at bedtime.   Continue Humalog 6 units subcutaneously before meals plus correction dosing.   Continue correction dosing every 4 hours outside of meal times using correction dosing.   Target 150, ISF 40.     G7 CGM sent to pharmacy.   Blood Sugar Goals:       Fastin-130.       1-2 hours after a meal: Less than 180.

## 2024-05-28 ENCOUNTER — TELEPHONE (OUTPATIENT)
Dept: DIABETES | Facility: CLINIC | Age: 24
End: 2024-05-28
Payer: COMMERCIAL

## 2024-05-29 ENCOUNTER — PATIENT MESSAGE (OUTPATIENT)
Dept: DIABETES | Facility: CLINIC | Age: 24
End: 2024-05-29
Payer: COMMERCIAL

## 2024-06-11 ENCOUNTER — CLINICAL SUPPORT (OUTPATIENT)
Dept: DIABETES | Facility: CLINIC | Age: 24
End: 2024-06-11
Payer: COMMERCIAL

## 2024-06-11 DIAGNOSIS — E10.65 TYPE 1 DIABETES MELLITUS WITH HYPERGLYCEMIA: Primary | ICD-10-CM

## 2024-06-11 PROCEDURE — G0108 DIAB MANAGE TRN  PER INDIV: HCPCS | Mod: S$GLB,,, | Performed by: DIETITIAN, REGISTERED

## 2024-06-11 PROCEDURE — 99999 PR PBB SHADOW E&M-EST. PATIENT-LVL III: CPT | Mod: PBBFAC,,, | Performed by: DIETITIAN, REGISTERED

## 2024-06-11 NOTE — Clinical Note
Met with Gracie today for education/pump eval. She is favoring the OP5 but we discussed Tandem pumps too. She is going to review the information and discuss her choice with you at your visit next week. I was able to connect her with our Dexcom clarity account and will send you her 2 week report. She reports taking her insulin as prescribed but her BG is poorly controlled. We discussed importance of carb counting and she will start practicing now using resources provided.

## 2024-06-11 NOTE — PROGRESS NOTES
Diabetes Care Specialist Progress Note  Author: Angela Mtaa RD, CDE  Date: 6/11/2024    Program Intake  Reason for Diabetes Program Visit:: Initial Diabetes Assessment  Current diabetes risk level:: moderate  In the last 12 months, have you:: used emergency room services  Was the ER or hospital admission related to diabetes?: Yes  Permission to speak with others about care:: no  Continuous Glucose Monitoring  Patient has CGM: Yes  Personal CGM type:: Dexcom G7  GMI Date: 06/11/24  GMI Value: 10.2 %    Lab Results   Component Value Date    HGBA1C 10.8 (H) 04/04/2024       Clinical        Problem Review  Reviewed Problem List with Patient: yes  Active comorbidities affecting diabetes self-care.: no    Clinical Assessment  Current Diabetes Treatment: Diet, Insulin  Have you ever experienced hypoglycemia (low blood sugar)?: yes  In the last month, how often have you experienced low blood sugar?: other (see comments) (not often)  Are you able to tell when your blood sugar is low?: Yes  Have you ever been hospitalized because your blood sugar was too low?: no  How do you treat hypoglycemia (low blood sugar)?: 1/2 can soda/fruit juice, 4 glucose tablets  Have you ever experienced hyperglycemia (high blood sugar)?: yes  In the last month, how often have you experienced high blood sugar?: more than once a day  Are you able to tell when your blood sugar is high?: Yes  What are your symptoms?: thirst, fatigue  Have you ever been hospitalized because your blood sugar was high?: yes (see comments)    Medication Information  How do you obtain your medications?: Patient drives  How many days a week do you miss your medications?: Never  Do you sometimes have difficulty refilling your medications?: No  Medication adherence impacting ability to self-manage diabetes?: No    Labs  Do you have regular lab work to monitor your medications?: Yes  Type of Regular Lab Work: A1c, Microalbumin, CBC  Where do you get your labs drawn?:  [facility]  Lab Compliance Barriers: No    Nutritional Status  Diet: Regular  Meal Plan 24 Hour Recall: Breakfast, Lunch, Dinner  Meal Plan 24 Hour Recall - Breakfast: nothing  Meal Plan 24 Hour Recall - Lunch: raising cane's: 2 chicken fingers, fries, 1/2 slice of toast, hi-c drink  Meal Plan 24 Hour Recall - Dinner: boudin, 1.5 cups of spahetti, water  Change in appetite?: No  Dentation:: Intact  Recent Changes in Weight: No Recent Weight Change  Current nutritional status an area of need that is impacting patient's ability to self-manage diabetes?: No    Additional Social History    Support  Does anyone support you with your diabetes care?: yes  Who supports you?: self, parent  Who takes you to your medical appointments?: self  Does the current support meet the patient's needs?: Yes  Is Support an area impacting ability to self-manage diabetes?: No    Access to Mass Media & Technology  Does the patient have access to any of the following devices or technologies?: Smart phone  Media or technology needs impacting ability to self-manage diabetes?: No    Cognitive/Behavioral Health  Alert and Oriented: Yes  Difficulty Thinking: No  Requires Prompting: No  Requires assistance for routine expression?: No  Cognitive or behavioral barriers impacting ability to self-manage diabetes?: No    Culture/Pentecostalism  Culture or Spiritism beliefs that may impact ability to access healthcare: No    Communication  Language preference: English  Hearing Problems: No  Vision Problems: Yes  Vision problem type:: Decreased Vision  Vision Assistance: Glasses  Communication needs impacting ability to self-manage diabetes?: No    Health Literacy  Preferred Learning Method: Face to Face  How often do you need to have someone help you read instructions, pamphlets, or written material from your doctor or pharmacy?: Never  Health literacy needs impacting ability to self-manage diabetes?: No      Diabetes Self-Management Skills Assessment    Diabetes  Disease Process/Treatment Options  Patient/caregiver able to state what happens when someone has diabetes.: yes  Patient/caregiver knows what type of diabetes they have.: yes  Diabetes Type : Type I  Patient/caregiver able to identify at least three signs and symptoms of diabetes.: yes  Identified signs and symptoms:: increased thirst, frequent urination, fatigue  Patient able to identify at least three risk factors for diabetes.: no  Diabetes Disease Process/Treatment Options: Skills Assessment Completed: Yes  Assessment indicates:: Adequate understanding  Area of need?: No    Nutrition/Healthy Eating  Method of carbohydrate measurement:: eyeballing/guessing  Patient can identify foods that impact blood sugar.: yes  Patient-identified foods:: sweets, soda  Nutrition/Healthy Eating Skills Assessment Completed:: Yes  Assessment indicates:: Instruction Needed  Area of need?: Yes    Physical Activity/Exercise  Patient's daily activity level:: lightly active  Patient formally exercises outside of work.: no  Reasons for not exercising:: other (see comments) (active at work)  Patient can identify forms of physical activity.: yes  Stated forms of physical activity:: any movement performed by muscles that uses energy  Patient can identify reasons why exercise/physical activity is important in diabetes management.: yes  Identified reasons:: helps insulin work better  Physical Activity/Exercise Skills Assessment Completed: : Yes  Assessment indicates:: Adequate understanding  Area of need?: No    Medications  Patient is able to describe current diabetes management routine.: yes  Diabetes management routine:: insulin  Patient is able to identify current diabetes medications, dosages, and appropriate timing of medications.: yes (Humalog 6u + SS with meals // Lantus 26u HS)  Patient understands the purpose of the medications taken for diabetes.: yes  Patient reports problems or concerns with current medication regimen.:  yes  Medication regimen problems/concerns:: does not feel like regimen is working  Medication Skills Assessment Completed:: Yes  Assessment indicates:: Instruction Needed, Knowledge deficit  Area of need?: Yes    Home Blood Glucose Monitoring  Patient states that blood sugar is checked at home daily.: yes  Monitoring Method:: personal continuous glucose monitor  Personal CGM type:: Dexcom G7  Patient is able to use personal CGM appropriately.: yes  CGM Report reviewed?: yes  Home Blood Glucose Monitoring Skills Assessment Completed: : Yes  Assessment indicates:: Adequate understanding  Area of need?: No            Acute Complications  Patient is able to identify types of acute complications: Yes  Patient Identified:: Hypoglycemia  Patient is able to state the basic meaning of hypoglycemia?: Yes  Able to state the blood sugar range for hypoglycemia?: yes  Patient can identify general symptoms of hypoglycemia: yes  Patient identified:: shakiness  Able to state proper treatment of hypoglycemia?: yes  Patient identified:: 4 glucose tablets, 1/2 can soda/fruit juice  Acute Complications Skills Assessment Completed: : Yes  Assessment indicates:: Adequate understanding  Area of need?: No         Assessment Summary and Plan    Based on today's diabetes care assessment, the following areas of need were identified:          6/11/2024    12:01 AM   Social   Support No   Access to Mass Media/Tech No   Cognitive/Behavioral Health No   Culture/Yazidi No   Communication No   Health Literacy No            6/11/2024    12:01 AM   Clinical   Medication Adherence No   Lab Compliance No   Nutritional Status No            6/11/2024    12:01 AM   Diabetes Self-Management Skills   Diabetes Disease Process/Treatment Options Reviewed patho of Type 1 diabetes and importance of good BG control to prevent long term complications.    Nutrition/Healthy Eating Yes- see care plan.    Physical Activity/Exercise No   Medication Yes- see care plan.    Discussed pump therapy:   Patient is interested in an insulin pump and seen today to discuss insulin pump therapy.     -Covered expectations for insulin pump approval by provider and insurance company such as: SMBG a min of qid, additional labs, insurance verification, possible costs associated with monthly pump supplies, and overall cost.     --Discussed basic details of pump therapy with patient.     -Reviewed current insulin pumps available: Omnipod 5, Tandem t slim or Mobi.     -Discussed integration with Dexcom G6 and G7 and automation.      -Reviewed terms such as insulin sensitivity factor (ISF), carbohydrate ratio, target glucose, bolus, basal, active insulin and insulin on board.  Explained importance of learning to carb count at meals to effectively enter carbs (grams) when bolusing at meals.      -Explained each insulin pumps allow for different max volumes of insulin in reservoir chamber. Tandem tslim max volume is 300 units, Tandem Mobi 200 units, and Omnipod max volume is 200 units.     -Patient verbalized understanding of pump therapy and able to see pump simulator on educator's phone.      -Patient is favoring the Omnipod 5 system but will review information provided, research at home and discuss with Diabetes NEHEMIAH at next week's visit.    Home Blood Glucose Monitoring Connected patient to clinic Dexcom account. Uploaded 14 day report and sending to Pauly German for review.   BG is poorly controlled despite patient taking insulin as prescribed.    Acute Complications Reviewed blood glucose goals, prevention, detection, signs and symptoms, and treatment of hypoglycemia and hyperglycemia.           Today's interventions were provided through individual discussion, instruction, and written materials were provided.      Patient verbalized understanding of instruction and written materials.  Pt was able to return back demonstration of instructions today. Patient understood key points, needs  reinforcement and further instruction.     Diabetes Self-Management Care Plan:    Today's Diabetes Self-Management Care Plan was developed with Gracie's input. Gracie has agreed to work toward the following goal(s) to improve his/her overall diabetes control.      Care Plan: Diabetes Management   Updates made since 5/12/2024 12:00 AM        Problem: Medications         Goal: Patient will discuss insulin pump choice with Diabetes NEHEMIAH at next week's visit.    Start Date: 6/11/2024   Expected End Date: 6/17/2024   Priority: High   Barriers: No Barriers Identified        Task: Reviewed with patient all current diabetes medications and provided basic review of the purpose, dosage, frequency, side effects, and storage of both oral and injectable diabetes medications. Completed 6/11/2024        Task: Discussed pump options available and provided pamphlets for patient to review. Completed 6/11/2024        Problem: Healthy Eating         Goal: Patient will carb count at meals and snacks.    Start Date: 6/11/2024   Expected End Date: 12/11/2024   Priority: Medium   Barriers: No Barriers Identified        Task: Reviewed the sources and role of Carbohydrate, Protein, and Fat and how each nutrient impacts blood sugar. Completed 6/11/2024        Task: Provided visual examples using dry measuring cups, food models, and other familiar objects such as computer mouse, deck or cards, tennis ball etc. to help with visualization of portions. Completed 6/11/2024        Task: Discussed different ways to carb count: using food labels, food lists, or smart phone apps. Completed 6/11/2024        Task: Review the importance of balancing carbohydrates with each meal using portion control techniques to count servings of carbohydrate and label reading to identify serving size and amount of total carbs per serving. Completed 6/11/2024          Follow Up Plan     Follow up in about 1 month (around 7/11/2024) for E10.65.    Today's care plan  and follow up schedule was discussed with patient.  Ashontre verbalized understanding of the care plan, goals, and agrees to follow up plan.        The patient was encouraged to communicate with his/her health care provider/physician and care team regarding his/her condition(s) and treatment.  I provided the patient with my contact information today and encouraged to contact me via phone or [facility] Patient Portal as needed.     Length of Visit   Total Time: 60 Minutes

## 2024-06-17 ENCOUNTER — OFFICE VISIT (OUTPATIENT)
Dept: DIABETES | Facility: CLINIC | Age: 24
End: 2024-06-17
Payer: COMMERCIAL

## 2024-06-17 DIAGNOSIS — E10.65 TYPE 1 DIABETES MELLITUS WITH HYPERGLYCEMIA: Primary | ICD-10-CM

## 2024-06-17 RX ORDER — BLOOD-GLUCOSE,RECEIVER,CONT
1 EACH MISCELLANEOUS DAILY
Qty: 1 EACH | Refills: 0 | Status: SHIPPED | OUTPATIENT
Start: 2024-06-17 | End: 2025-06-17

## 2024-06-17 RX ORDER — BLOOD-GLUCOSE SENSOR
1 EACH MISCELLANEOUS
Qty: 3 EACH | Refills: 11 | Status: SHIPPED | OUTPATIENT
Start: 2024-06-17 | End: 2025-06-17

## 2024-06-17 RX ORDER — INSULIN PMP CART,AUT,G6/7,CNTR
1 EACH SUBCUTANEOUS
Qty: 30 EACH | Refills: 3 | Status: SHIPPED | OUTPATIENT
Start: 2024-06-17

## 2024-06-17 RX ORDER — BLOOD-GLUCOSE TRANSMITTER
1 EACH MISCELLANEOUS
Qty: 1 EACH | Refills: 3 | Status: SHIPPED | OUTPATIENT
Start: 2024-06-17 | End: 2025-06-17

## 2024-06-17 RX ORDER — INSULIN PMP CART,AUT,G6/7,CNTR
1 EACH SUBCUTANEOUS
Qty: 1 EACH | Refills: 0 | Status: SHIPPED | OUTPATIENT
Start: 2024-06-17

## 2024-06-17 RX ORDER — INSULIN LISPRO 100 [IU]/ML
200 INJECTION, SOLUTION INTRAVENOUS; SUBCUTANEOUS
Qty: 60 ML | Refills: 3 | Status: SHIPPED | OUTPATIENT
Start: 2024-06-17 | End: 2025-06-17

## 2024-06-17 NOTE — PATIENT INSTRUCTIONS
PATIENT INSTRUCTIONS      Omnipod 5 Insulin pump ordered with interclick G6 CGM.     Increase Lantus to 28 units subcutaneously daily at bedtime.   Continue Humalog 6 units subcutaneously before meals plus correction dosing.   Continue correction dosing every 4 hours outside of meal times using correction dosing.   Target 150, ISF 30.      G7 CGM    Blood Sugar Goals:       Fastin-130.       1-2 hours after a meal: Less than 180.

## 2024-06-17 NOTE — PROGRESS NOTES
The patient location is: Home  The chief complaint leading to consultation is: Diabetes    Visit type: audiovisual    Face to Face time with patient: 15  30 minutes of total time spent on the encounter, which includes face to face time and non-face to face time preparing to see the patient (eg, review of tests), Obtaining and/or reviewing separately obtained history, Documenting clinical information in the electronic or other health record, Independently interpreting results (not separately reported) and communicating results to the patient/family/caregiver, or Care coordination (not separately reported).  Each patient to whom he or she provides medical services by telemedicine is:  (1) informed of the relationship between the physician and patient and the respective role of any other health care provider with respect to management of the patient; and (2) notified that he or she may decline to receive medical services by telemedicine and may withdraw from such care at any time.    Notes:    Gracie Irizarry is a 24 y.o. female who presents for a follow up evaluation of Type 2 diabetes mellitus.     CHIEF COMPLAINT: Diabetes Consultation    PCP: Kobi Martin MD      Initial visit with me - 5/27/2024    The patient was initially diagnosed with diabetes at age 7.   Followed by Dr. Jayshree barnett prior to aging out of peds clinic.   Patient seen once by Dr. Jasmine at [facility] in February of 2023 which was last encounter with an endocrinologist.   Numerous admission for DKA.   Never been on a insulin pump.     Previous failed treatments include:  Dexcom G6 CGM - was upgraded to G7, then fell out of care with last provider. Has not had in some time.     Social Documentation:  Patient lives in [place] with mom and dad.   Occupation: works at a day care. Working 7am-6pm M-F.  Exercise:       Diabetes related complications:   nephropathy, retinopathy, and autonomic neuropathy, Mauriac syndrome  denies  "Pancreatitis  denies Gastroparesis  reports DKA  denies Hx/family Hx of MEN2/MTC  denies Frequent UTIs/yeast infections     Cardiovascular Risk Factors: dyslipidemia, hypertension, and microalbuminuria.    Diabetes Medications               BAQSIMI 3 mg/actuation Spry SMARTSI Spray(s) Both Nares PRN    HUMALOG KWIKPEN INSULIN 100 unit/mL pen TO BE USED BY SLIDING SCALE 3 TIMES A DAY FOR GLUCOSE CONTROL    LANTUS SOLOSTAR U-100 INSULIN 100 unit/mL (3 mL) InPn pen Inject 26 Units into the skin once daily.     Current monitoring regimen:  Dexcom G7 CGM    The patient's Dexcom CGM was downloaded and reviewed. For the past 14 days, patient average glucose was 297 mg/dL. She was above range 81% of the time, in range 19% of the time, and below range 0% of the time. The target range for this patient was 70 - 180 mg/dL. Overall, there was a pattern of hyperglycemia.      Patient currently taking insulin or sulfonylurea?  Yes. Emergency Glucagon Prescription current? yes  Recent hypoglycemic episodes: No.     Patient compliant with glucose checks and medication administration? Yes    DIABETES MANAGEMENT STATUS  Statin: Taking  ACE/ARB: Taking  Screening or Prevention Patient's value Goal Complete/Controlled?   HgA1C Testing and Control   Lab Results   Component Value Date    HGBA1C 10.8 (H) 2024      Annually/Less than 8% No   Lipid profile : 2023 Annually No   LDL control Lab Results   Component Value Date    LDLCALC 81 2023    Annually/Less than 100 mg/dl  Yes   Nephropathy screening Lab Results   Component Value Date    LABMICR 13.0 2024     No results found for: "PROTEINUA"  No results found for: "UTPCR"   Annually Yes   Blood pressure BP Readings from Last 1 Encounters:   24 130/88    Less than 140/90 Yes   Dilated retinal exam : 2022 Annually No   Foot exam   Most Recent Foot Exam Date: Not Found Annually No   Patient's medications, allergies, surgical, social and family histories " "were reviewed and updated as appropriate.     Review of Systems   Constitutional:  Negative for weight loss.   Eyes:  Negative for blurred vision and double vision.   Cardiovascular:  Negative for chest pain.   Gastrointestinal:  Negative for nausea and vomiting.   Genitourinary:  Negative for frequency.   Musculoskeletal:  Negative for falls.   Neurological:  Negative for dizziness and weakness.   Endo/Heme/Allergies:  Negative for polydipsia.   Psychiatric/Behavioral:  Negative for depression.    All other systems reviewed and are negative.       Physical Exam  Constitutional:       Appearance: Normal appearance.   HENT:      Head: Normocephalic and atraumatic.   Pulmonary:      Effort: No respiratory distress.   Musculoskeletal:      Cervical back: Normal range of motion.   Neurological:      Mental Status: She is alert and oriented to person, place, and time.   Psychiatric:         Mood and Affect: Mood normal.         Behavior: Behavior normal.        Last menstrual period 03/31/2024.  Wt Readings from Last 3 Encounters:   05/24/24 62.3 kg (137 lb 5.6 oz)   04/04/24 64.1 kg (141 lb 6.8 oz)       LAB REVIEW  Lab Results   Component Value Date     04/04/2024    K 4.9 04/04/2024     04/04/2024    CO2 23 04/04/2024    BUN 12 04/04/2024    CREATININE 0.8 04/04/2024    CALCIUM 9.9 04/04/2024    ANIONGAP 11 04/04/2024    EGFRNORACEVR >60.0 04/04/2024     No results found for: "CPEPTIDE", "GLUTAMICACID", "INSLNABS"  Hemoglobin A1C   Date Value Ref Range Status   04/04/2024 10.8 (H) 4.0 - 5.6 % Final     Comment:     ADA Screening Guidelines:  5.7-6.4%  Consistent with prediabetes  >or=6.5%  Consistent with diabetes    High levels of fetal hemoglobin interfere with the HbA1C  assay. Heterozygous hemoglobin variants (HbS, HgC, etc)do  not significantly interfere with this assay.   However, presence of multiple variants may affect accuracy.     01/10/2024 11.2 (H) <=6.5 % Final   09/15/2023 10.1 (H) 4.8 - 5.6 " % Final     Comment:              Prediabetes: 5.7 - 6.4           Diabetes: >6.4           Glycemic control for adults with diabetes: <7.0   07/13/2023 9.7 (H) 4.8 - 5.6 % Final     Comment:              Prediabetes: 5.7 - 6.4           Diabetes: >6.4           Glycemic control for adults with diabetes: <7.0        ASSESSMENT    ICD-10-CM ICD-9-CM   1. Type 1 diabetes mellitus with hyperglycemia  E10.65 250.01       PLAN  Diagnoses and all orders for this visit:    Type 1 diabetes mellitus with hyperglycemia  -     blood-glucose meter,continuous (DEXCOM G6 ) Misc; 1 each by Misc.(Non-Drug; Combo Route) route once daily.  -     blood-glucose sensor (DEXCOM G6 SENSOR) Nadia; 1 each by Misc.(Non-Drug; Combo Route) route every 10 days.  -     blood-glucose transmitter (DEXCOM G6 TRANSMITTER) Nadia; 1 each by Misc.(Non-Drug; Combo Route) route every 3 (three) months.  -     insulin pump cart,auto,BT-cntr (OMNIPOD 5 G6 INTRO KIT, GEN 5,) Crtg; Inject 1 each into the skin Every 3 (three) days.  -     insulin pump cart,automated,BT (OMNIPOD 5 G6 PODS, GEN 5,) Crtg; Inject 1 each into the skin Every 3 (three) days.  -     insulin lispro (HUMALOG U-100 INSULIN) 100 unit/mL injection; Inject 200 Units into the skin Every 3 (three) days. VIA OMNIPOD 5 INSULIN PUMP        Reviewed pathophysiology of diabetes, complications related to the disease, importance of annual dilated eye exam and daily foot examination. Explained MOA, SE, dosage of medications. Written instructions given and reviewed with patient and patient verbalizes understanding.     PATIENT INSTRUCTIONS      Omnipod 5 Insulin pump ordered with Dexcom G6 CGM.     Increase Lantus to 28 units subcutaneously daily at bedtime.   Continue Humalog 6 units subcutaneously before meals plus correction dosing.   Continue correction dosing every 4 hours outside of meal times using correction dosing.   Target 150, ISF 30.      G7 CGM    Blood Sugar Goals:       Fasting:  .       1-2 hours after a meal: Less than 180.       Follow up in about 6 weeks (around 7/29/2024) for Virtual, Dexcom, OP5.    Portions of this note were prepared with [employer] Naturally Speaking voice recognition transcription software. Grammatical errors, including garbled syntax, mangle pronouns, and other bizarre constructions may be attributed to that software system.

## 2024-06-25 ENCOUNTER — PATIENT MESSAGE (OUTPATIENT)
Dept: DIABETES | Facility: CLINIC | Age: 24
End: 2024-06-25
Payer: COMMERCIAL

## 2024-07-02 ENCOUNTER — TELEPHONE (OUTPATIENT)
Dept: DIABETES | Facility: CLINIC | Age: 24
End: 2024-07-02
Payer: COMMERCIAL

## 2024-07-02 NOTE — TELEPHONE ENCOUNTER
INSULIN PUMP START ORDERS    Pump Type: OP5  Insulin Vials: Humalog vials sent to pharmacy   Instructions to stop Current Regimen: Take 14 units of Lantus at bedtime night before training.       INITIAL PUMP SETTINGS      Basal Rate:  0000 - 0000:  0.65 units/hr    Insulin to Carb Ratio:   15 grams/unit    Correction Factor:  55 mg/dL/unit    Duration of Action:  4 hrs    Max Basal Rate:  4 units/hr    Max Bolus:  20 units     Target B - 0000: 130 mg/dL    Correct Above: 130 mg/dL

## 2024-07-02 NOTE — TELEPHONE ENCOUNTER
----- Message from Angela Mata RD, CDE sent at 7/2/2024  7:23 AM CDT -----  Regarding: pump start orders  Gracie is scheduled with me for OP5 training on 7/16/24. Can you send me orders? Thank you.

## 2024-07-15 ENCOUNTER — PATIENT MESSAGE (OUTPATIENT)
Dept: DIABETES | Facility: CLINIC | Age: 24
End: 2024-07-15
Payer: COMMERCIAL

## 2024-07-17 DIAGNOSIS — E11.9 TYPE 2 DIABETES MELLITUS WITHOUT COMPLICATION: ICD-10-CM

## 2024-07-24 ENCOUNTER — TELEPHONE (OUTPATIENT)
Dept: DIABETES | Facility: CLINIC | Age: 24
End: 2024-07-24
Payer: COMMERCIAL

## 2024-07-24 ENCOUNTER — PATIENT OUTREACH (OUTPATIENT)
Dept: ADMINISTRATIVE | Facility: HOSPITAL | Age: 24
End: 2024-07-24
Payer: COMMERCIAL

## 2024-07-24 DIAGNOSIS — E11.9 TYPE 2 DIABETES MELLITUS WITHOUT COMPLICATION: ICD-10-CM

## 2024-07-24 NOTE — TELEPHONE ENCOUNTER
----- Message from Elisha Lay MA sent at 7/24/2024 10:21 AM CDT -----  Contact: Johnathon with [facility]    ----- Message -----  From: Akira Ogden  Sent: 7/24/2024   9:21 AM CDT  To: Maxi Coats Staff    .Type: Patient Call Back        Who called:   Johnathon with Insulin pharmacy      What is the request in detail:    Called in needing the OMNI pod prescription to be cancelled for patient because its cheaper if the refill is sent through [facility] . It only needs to be cancelled for the [facility] to be processed at [facility]   Can the clinic reply by [facility]?           Would the patient rather a call back or a response via [facility]?      no call back needed  Best call back number:   No call back

## 2024-07-24 NOTE — PROGRESS NOTES
VBHM Score: 5     Cervical Cancer Screening  Eye Exam  Hemoglobin A1c  Lipid Panel  Foot Exam                   Health Maintenance Topic(s) Outreach Outcomes & Actions Taken:    Cervical Cancer Screening - Outreach Outcomes & Actions Taken  : patient has appt scheduled 7/31/24 for annual pap    Diabetic Foot Exam - Outreach Outcomes & Actions Taken  : patient has appt with podiatry on 7/25/24       Additional Notes:  Attempted to contact the patient to discuss/schedule overdue HM, no answer, no voicemail.           Care Management, Digital Medicine, and/or Education Referrals    OPCM Risk Score: 67.2         Next Steps - Referral Actions: no referrals        Additional Notes:  SDOH reviewed 1/2024, patient has upcoming appointment with diabetes ed on 7/29/24

## 2024-07-25 ENCOUNTER — OFFICE VISIT (OUTPATIENT)
Dept: PODIATRY | Facility: CLINIC | Age: 24
End: 2024-07-25
Payer: COMMERCIAL

## 2024-07-25 DIAGNOSIS — E10.9 COMPREHENSIVE DIABETIC FOOT EXAMINATION, TYPE 1 DM, ENCOUNTER FOR: Primary | ICD-10-CM

## 2024-07-25 DIAGNOSIS — R60.0 EDEMA OF RIGHT LOWER EXTREMITY: ICD-10-CM

## 2024-07-25 DIAGNOSIS — L97.911 ULCER OF RIGHT LEG, LIMITED TO BREAKDOWN OF SKIN: ICD-10-CM

## 2024-07-25 PROCEDURE — 99999 PR PBB SHADOW E&M-EST. PATIENT-LVL III: CPT | Mod: PBBFAC,,, | Performed by: PODIATRIST

## 2024-07-25 NOTE — PROGRESS NOTES
Subjective:       Patient ID: Gracie Irizarry is a 24 y.o. female.    Chief Complaint: Diabetic Foot Exam (Diabetic foot exam , no pain at present, )      HPI: Gracie Irizarry presents to the office today, under referral by their Primary Care Provider, Kobi Martin MD, for her annual diabetic foot assessment and risk evalution Patient is a DMI. Patient states impaired vision. DMII is managed by   Pauly German FNP. Does follow at [facility] for a RLE minor anterior tibia wound.     Hemoglobin A1C   Date Value Ref Range Status   04/04/2024 10.8 (H) 4.0 - 5.6 % Final     Comment:     ADA Screening Guidelines:  5.7-6.4%  Consistent with prediabetes  >or=6.5%  Consistent with diabetes    High levels of fetal hemoglobin interfere with the HbA1C  assay. Heterozygous hemoglobin variants (HbS, HgC, etc)do  not significantly interfere with this assay.   However, presence of multiple variants may affect accuracy.     01/10/2024 11.2 (H) <=6.5 % Final   09/15/2023 10.1 (H) 4.8 - 5.6 % Final     Comment:              Prediabetes: 5.7 - 6.4           Diabetes: >6.4           Glycemic control for adults with diabetes: <7.0   07/13/2023 9.7 (H) 4.8 - 5.6 % Final     Comment:              Prediabetes: 5.7 - 6.4           Diabetes: >6.4           Glycemic control for adults with diabetes: <7.0   .    Review of patient's allergies indicates:   Allergen Reactions    Lisinopril Swelling       Past Medical History:   Diagnosis Date    Diabetes mellitus type I     GERD (gastroesophageal reflux disease)        No family history on file.    Social History     Socioeconomic History    Marital status: Single   Tobacco Use    Smoking status: Never     Passive exposure: Never    Smokeless tobacco: Never   Substance and Sexual Activity    Alcohol use: Never    Drug use: Never    Sexual activity: Never     Social Determinants of Health     Financial Resource Strain: Low Risk  (9/15/2023)    [facility], [facility]    Overall Financial Resource Strain (CARDIA)     Difficulty of Paying Living Expenses: Not hard at all   Food Insecurity: No Food Insecurity (1/10/2024)    [facility]    Hunger Vital Sign     Worried About Running Out of Food in the Last Year: Never true     Ran Out of Food in the Last Year: Never true   Transportation Needs: No Transportation Needs (1/10/2024)    [facility], [facility]    PRAPARE - Transportation     Lack of Transportation (Medical): No     Lack of Transportation (Non-Medical): No   Physical Activity: Inactive (9/15/2023)    [facility], [facility]    Exercise Vital Sign     Days of Exercise per Week: 0 days     Minutes of Exercise per Session: 0 min   Stress: No Stress Concern Present (9/15/2023)    [facility], [facility]    [employer] - Occupational Stress Questionnaire     Feeling of Stress : Not at all   Housing Stability: Low Risk  (1/10/2024)    [facility], [facility]    Housing Stability Vital Sign      Unable to Pay for Housing in the Last Year: No     Number of Places Lived in the Last Year: 1     Unstable Housing in the Last Year: No       Past Surgical History:   Procedure Laterality Date    CHOLECYSTECTOMY      TONSILLECTOMY         Review of Systems   Constitutional:  Negative for chills, fatigue and fever.   HENT:  Negative for hearing loss.    Eyes:  Negative for photophobia and visual disturbance.   Respiratory:  Negative for cough, chest tightness, shortness of breath and wheezing.    Cardiovascular:  Positive for leg swelling. Negative for chest pain and palpitations.   Gastrointestinal:  Negative for constipation, diarrhea, nausea and vomiting.   Endocrine: Negative for cold intolerance and heat intolerance.   Genitourinary:  Negative for flank pain.   Musculoskeletal:  Negative for neck pain and neck stiffness.   Skin:  Positive for wound.   Neurological:  Negative for light-headedness and headaches.   Psychiatric/Behavioral:  Negative for sleep disturbance.          Objective:   There were no vitals taken for this visit.    Physical Exam  LOWER EXTREMITY PHYSICAL EXAMINATION    DERMATOLOGY: Skin is supple, dry and intact. Healing/Healed ulceration,  RLE, anterior shin, distal. No infection is noted.    VASCULAR: The LLE dorsalis pedis pulse is 2/4 and the posterior tibial pulse is 1/4. The RLE dorsalis pedis pulse is 1/4 and the posterior tibial pulse is 1/4. Hair growth is noted on the dorsal foot and digits. Proximal to distal, warm to warm. Capillary refill time is WNL at less than 3s.    ORTHOPEDIC: Manual Muscle Testing is 5/5 in all planes on the B/L, without pains, with and without resistance.    NEUROLOGY: Proprioception is intact. Sensation to light touch is intact.     Assessment:     1. Comprehensive diabetic foot examination, type 1 DM, encounter for    2. Ulcer of right leg, limited to breakdown of skin    3. Edema of right lower extremity        Plan:     Comprehensive diabetic foot  examination, type 1 DM, encounter for    Ulcer of right leg, limited to breakdown of skin    Edema of right lower extremity       I counseled the patient on his/her Diabetic Mellitus regarding today's clinical examination and objection findings. We did also discuss recent medication changes, pertinent labs and imaging evaluations and other medical consultation notes and progress notes. Greater than 50% of this visit was spent on counseling and coordination of care. Greater than 20 minutes of this appt. was spent on education about the diabetic foot, in relation to PVD and/or neuropathy, and the prevention of limb loss.     Shoe gear is inspected and wear and proper fit/type. Patient is reminded of the importance of good nutrition and blood sugar control to help prevent podiatric complications of diabetes. Patient instructed on proper foot hygeine. We discussed wearing proper shoe gear, daily foot inspections, never walking without protective shoe gear, never putting sharp instruments to feet.  Patient  will continue to monitor the areas daily, inspect feet, wear protective shoe gear when ambulatory, moisturizer to maintain skin integrity.     Patient's DMI/DMII is managed by Primary Care Provider and/or Endocrinology Advanced Practice Provider. As per the most recent glycohemoglobin level, this patient is not at goal.    Thorough discussion is had with the patient today, concerning the diagnosis, its etiology, and the treatment algorithm at present. Unna Boot application to the RLE in standard fashion.  Patient may remove the Unna Boot one to two days prior to next appt.     Continue [facility] follow up.      Protective Sensation (w/ 10 gram monofilament):  Right: Intact  Left: Intact    Visual Inspection:  Normal -  Bilateral    Pedal Pulses:   Right: Present  Left: Diminished    Posterior Tibialis Pulses:   Right:Diminished  Left: Diminished                Future Appointments   Date Time Provider Department  [place]   7/29/2024 12:00 PM Pauly German FNP ON DIABETE  Medical [facility]   7/31/2024 10:30 AM TAYLOR Plunkett MD ON OBGYN  [facility]

## 2024-07-29 ENCOUNTER — OFFICE VISIT (OUTPATIENT)
Dept: DIABETES | Facility: CLINIC | Age: 24
End: 2024-07-29
Payer: COMMERCIAL

## 2024-07-29 DIAGNOSIS — E78.5 DYSLIPIDEMIA DUE TO TYPE 1 DIABETES MELLITUS: ICD-10-CM

## 2024-07-29 DIAGNOSIS — E10.69 DYSLIPIDEMIA DUE TO TYPE 1 DIABETES MELLITUS: ICD-10-CM

## 2024-07-29 DIAGNOSIS — I10 ESSENTIAL HYPERTENSION: ICD-10-CM

## 2024-07-29 DIAGNOSIS — E10.65 TYPE 1 DIABETES MELLITUS WITH HYPERGLYCEMIA: Primary | ICD-10-CM

## 2024-07-29 PROCEDURE — 4010F ACE/ARB THERAPY RXD/TAKEN: CPT | Mod: CPTII,95,, | Performed by: NURSE PRACTITIONER

## 2024-07-29 PROCEDURE — 95251 CONT GLUC MNTR ANALYSIS I&R: CPT | Mod: NDTC,,, | Performed by: NURSE PRACTITIONER

## 2024-07-29 PROCEDURE — G2211 COMPLEX E/M VISIT ADD ON: HCPCS | Mod: 95,,, | Performed by: NURSE PRACTITIONER

## 2024-07-29 PROCEDURE — 3066F NEPHROPATHY DOC TX: CPT | Mod: CPTII,95,, | Performed by: NURSE PRACTITIONER

## 2024-07-29 PROCEDURE — 99214 OFFICE O/P EST MOD 30 MIN: CPT | Mod: 95,,, | Performed by: NURSE PRACTITIONER

## 2024-07-29 PROCEDURE — 3046F HEMOGLOBIN A1C LEVEL >9.0%: CPT | Mod: CPTII,95,, | Performed by: NURSE PRACTITIONER

## 2024-07-29 PROCEDURE — 3061F NEG MICROALBUMINURIA REV: CPT | Mod: CPTII,95,, | Performed by: NURSE PRACTITIONER

## 2024-07-29 NOTE — PATIENT INSTRUCTIONS
PATIENT INSTRUCTIONS      Labs ordered and will be scheduled by [facility] staff.      Increase Lantus to 28 units subcutaneously daily at bedtime.   Continue Humalog 6 units subcutaneously before meals plus correction dosing.   Continue correction dosing every 4 hours outside of meal times using correction dosing.   Target 150, ISF 30.      G7 CGM    Blood Sugar Goals:       Fastin-130.       1-2 hours after a meal: Less than 180.

## 2024-07-29 NOTE — PROGRESS NOTES
The patient location is: Home  The chief complaint leading to consultation is: Diabetes    Visit type: audiovisual    Face to Face time with patient: 15  30 minutes of total time spent on the encounter, which includes face to face time and non-face to face time preparing to see the patient (eg, review of tests), Obtaining and/or reviewing separately obtained history, Documenting clinical information in the electronic or other health record, Independently interpreting results (not separately reported) and communicating results to the patient/family/caregiver, or Care coordination (not separately reported).  Each patient to whom he or she provides medical services by telemedicine is:  (1) informed of the relationship between the physician and patient and the respective role of any other health care provider with respect to management of the patient; and (2) notified that he or she may decline to receive medical services by telemedicine and may withdraw from such care at any time.    Notes:    Gracie Irizarry is a 24 y.o. female who presents for a follow up evaluation of Type 1 diabetes mellitus.     CHIEF COMPLAINT: Diabetes Consultation    PCP: Kobi Martin MD      Initial visit with me - 5/27/2024    The patient was initially diagnosed with diabetes at age 7.   Followed by Dr. Jayshree barnett prior to aging out of peds clinic.   Patient seen once by Dr. Jasmine at [facility] in February of 2023 which was last encounter with an endocrinologist.   Numerous admission for DKA.   Never been on a insulin pump.     Previous failed treatments include:  Dexcom G6 CGM - was upgraded to G7, then fell out of care with last provider. Has not had in some time.     Social Documentation:  Patient lives in [place] with mom and dad.   Occupation: works at a day care. Working 7am-6pm M-F.  Exercise:       Diabetes related complications:   nephropathy, retinopathy, and autonomic neuropathy, Mauriac syndrome  denies  "Pancreatitis  denies Gastroparesis  reports DKA  denies Hx/family Hx of MEN2/MTC  denies Frequent UTIs/yeast infections     Cardiovascular Risk Factors: dyslipidemia, hypertension, and microalbuminuria.    Diabetes Medications               BAQSIMI 3 mg/actuation Spry SMARTSI Spray(s) Both Nares PRN    insulin lispro (HUMALOG U-100 INSULIN) 100 unit/mL injection Inject 200 Units into the skin Every 3 (three) days. VIA OMNIPOD 5 INSULIN PUMP    LANTUS SOLOSTAR U-100 INSULIN 100 unit/mL (3 mL) InPn pen Inject 26 Units into the skin once daily.     Current monitoring regimen:  Dexcom G7 CGM      Patient currently taking insulin or sulfonylurea?  Yes. Emergency Glucagon Prescription current? yes  Recent hypoglycemic episodes: No.     Patient compliant with glucose checks and medication administration? Yes    DIABETES MANAGEMENT STATUS  Statin: Taking  ACE/ARB: Taking  Screening or Prevention Patient's value Goal Complete/Controlled?   HgA1C Testing and Control   Lab Results   Component Value Date    HGBA1C 10.8 (H) 2024      Annually/Less than 8% No   Lipid profile : 2023 Annually No   LDL control Lab Results   Component Value Date    LDLCALC 81 2023    Annually/Less than 100 mg/dl  Yes   Nephropathy screening Lab Results   Component Value Date    LABMICR 13.0 2024     No results found for: "PROTEINUA"  No results found for: "UTPCR"   Annually Yes   Blood pressure BP Readings from Last 1 Encounters:   24 130/88    Less than 140/90 Yes   Dilated retinal exam : 2022 Annually No   Foot exam   : 2024 Annually No   Patient's medications, allergies, surgical, social and family histories were reviewed and updated as appropriate.     Review of Systems   Constitutional:  Negative for weight loss.   Eyes:  Negative for blurred vision and double vision.   Cardiovascular:  Negative for chest pain.   Gastrointestinal:  Negative for nausea and vomiting.   Genitourinary:  Negative for " "frequency.   Musculoskeletal:  Negative for falls.   Neurological:  Negative for dizziness and weakness.   Endo/Heme/Allergies:  Negative for polydipsia.   Psychiatric/Behavioral:  Negative for depression.    All other systems reviewed and are negative.       Physical Exam  Constitutional:       Appearance: Normal appearance.   HENT:      Head: Normocephalic and atraumatic.   Pulmonary:      Effort: No respiratory distress.   Musculoskeletal:      Cervical back: Normal range of motion.   Neurological:      Mental Status: She is alert and oriented to person, place, and time.   Psychiatric:         Mood and Affect: Mood normal.         Behavior: Behavior normal.        There were no vitals taken for this visit.  Wt Readings from Last 3 Encounters:   05/24/24 62.3 kg (137 lb 5.6 oz)   04/04/24 64.1 kg (141 lb 6.8 oz)       LAB REVIEW  Lab Results   Component Value Date     04/04/2024    K 4.9 04/04/2024     04/04/2024    CO2 23 04/04/2024    BUN 12 04/04/2024    CREATININE 0.8 04/04/2024    CALCIUM 9.9 04/04/2024    ANIONGAP 11 04/04/2024    EGFRNORACEVR >60.0 04/04/2024     No results found for: "CPEPTIDE", "GLUTAMICACID", "INSLNABS"  Hemoglobin A1C   Date Value Ref Range Status   04/04/2024 10.8 (H) 4.0 - 5.6 % Final     Comment:     ADA Screening Guidelines:  5.7-6.4%  Consistent with prediabetes  >or=6.5%  Consistent with diabetes    High levels of fetal hemoglobin interfere with the HbA1C  assay. Heterozygous hemoglobin variants (HbS, HgC, etc)do  not significantly interfere with this assay.   However, presence of multiple variants may affect accuracy.     01/10/2024 11.2 (H) <=6.5 % Final   09/15/2023 10.1 (H) 4.8 - 5.6 % Final     Comment:              Prediabetes: 5.7 - 6.4           Diabetes: >6.4           Glycemic control for adults with diabetes: <7.0   07/13/2023 9.7 (H) 4.8 - 5.6 % Final     Comment:              Prediabetes: 5.7 - 6.4           Diabetes: >6.4           Glycemic control for " adults with diabetes: <7.0        ASSESSMENT    ICD-10-CM ICD-9-CM   1. Type 1 diabetes mellitus with hyperglycemia  E10.65 250.01   2. Dyslipidemia due to type 1 diabetes mellitus  E10.69 250.81    E78.5 272.4   3. Essential hypertension  I10 401.9       PLAN  Diagnoses and all orders for this visit:    Type 1 diabetes mellitus with hyperglycemia  -     Hemoglobin A1C; Future  -     Basic Metabolic Panel; Future  -     Lipid Panel; Future    Dyslipidemia due to type 1 diabetes mellitus    Essential hypertension        Reviewed pathophysiology of diabetes, complications related to the disease, importance of annual dilated eye exam and daily foot examination. Explained MOA, SE, dosage of medications. Written instructions given and reviewed with patient and patient verbalizes understanding.     2024 - hyperglycemia on cgm, states glucoses higher this week on menstrual period. Did not receive OP5 starter kit with pods. In shipment now.     PATIENT INSTRUCTIONS      Increase Lantus to 28 units subcutaneously daily at bedtime.   Continue Humalog 6 units subcutaneously before meals plus correction dosing.   Continue correction dosing every 4 hours outside of meal times using correction dosing.   Target 150, ISF 30.      G7 CGM    Blood Sugar Goals:       Fastin-130.       1-2 hours after a meal: Less than 180.       Follow up in about 4 weeks (around 2024) for Dexcom, Schedule fasting labs.

## 2024-07-31 ENCOUNTER — PATIENT MESSAGE (OUTPATIENT)
Dept: DIABETES | Facility: CLINIC | Age: 24
End: 2024-07-31
Payer: COMMERCIAL

## 2024-07-31 ENCOUNTER — TELEPHONE (OUTPATIENT)
Dept: OBSTETRICS AND GYNECOLOGY | Facility: CLINIC | Age: 24
End: 2024-07-31
Payer: COMMERCIAL

## 2024-07-31 NOTE — TELEPHONE ENCOUNTER
Contacted patient, verified 2 patient identifiers, informed patient's mother to inform patient to call office to get rescheduled. Patient's mother verbalized understanding.

## 2024-08-01 ENCOUNTER — LAB VISIT (OUTPATIENT)
Dept: LAB | Facility: HOSPITAL | Age: 24
End: 2024-08-01
Attending: NURSE PRACTITIONER
Payer: COMMERCIAL

## 2024-08-01 DIAGNOSIS — E10.65 TYPE 1 DIABETES MELLITUS WITH HYPERGLYCEMIA: ICD-10-CM

## 2024-08-01 LAB
ANION GAP SERPL CALC-SCNC: 13 MMOL/L (ref 8–16)
BUN SERPL-MCNC: 15 MG/DL (ref 6–20)
CALCIUM SERPL-MCNC: 10 MG/DL (ref 8.7–10.5)
CHLORIDE SERPL-SCNC: 104 MMOL/L (ref 95–110)
CHOLEST SERPL-MCNC: 246 MG/DL (ref 120–199)
CHOLEST/HDLC SERPL: 3.5 {RATIO} (ref 2–5)
CO2 SERPL-SCNC: 21 MMOL/L (ref 23–29)
CREAT SERPL-MCNC: 0.8 MG/DL (ref 0.5–1.4)
EST. GFR  (NO RACE VARIABLE): >60 ML/MIN/1.73 M^2
ESTIMATED AVG GLUCOSE: 280 MG/DL (ref 68–131)
GLUCOSE SERPL-MCNC: 165 MG/DL (ref 70–110)
HBA1C MFR BLD: 11.4 % (ref 4–5.6)
HDLC SERPL-MCNC: 70 MG/DL (ref 40–75)
HDLC SERPL: 28.5 % (ref 20–50)
LDLC SERPL CALC-MCNC: 154.6 MG/DL (ref 63–159)
NONHDLC SERPL-MCNC: 176 MG/DL
POTASSIUM SERPL-SCNC: 4.2 MMOL/L (ref 3.5–5.1)
SODIUM SERPL-SCNC: 138 MMOL/L (ref 136–145)
TRIGL SERPL-MCNC: 107 MG/DL (ref 30–150)

## 2024-08-01 PROCEDURE — 36415 COLL VENOUS BLD VENIPUNCTURE: CPT | Performed by: NURSE PRACTITIONER

## 2024-08-01 PROCEDURE — 80048 BASIC METABOLIC PNL TOTAL CA: CPT | Performed by: NURSE PRACTITIONER

## 2024-08-01 PROCEDURE — 83036 HEMOGLOBIN GLYCOSYLATED A1C: CPT | Performed by: NURSE PRACTITIONER

## 2024-08-01 PROCEDURE — 80061 LIPID PANEL: CPT | Performed by: NURSE PRACTITIONER

## 2024-08-05 ENCOUNTER — CLINICAL SUPPORT (OUTPATIENT)
Dept: DIABETES | Facility: CLINIC | Age: 24
End: 2024-08-05
Payer: COMMERCIAL

## 2024-08-05 DIAGNOSIS — E10.65 TYPE 1 DIABETES MELLITUS WITH HYPERGLYCEMIA: Primary | ICD-10-CM

## 2024-08-05 PROCEDURE — 99999 PR PBB SHADOW E&M-EST. PATIENT-LVL III: CPT | Mod: PBBFAC,,, | Performed by: DIETITIAN, REGISTERED

## 2024-08-05 PROCEDURE — G0108 DIAB MANAGE TRN  PER INDIV: HCPCS | Mod: S$GLB,,, | Performed by: DIETITIAN, REGISTERED

## 2024-08-07 ENCOUNTER — PATIENT MESSAGE (OUTPATIENT)
Dept: DIABETES | Facility: CLINIC | Age: 24
End: 2024-08-07
Payer: COMMERCIAL

## 2024-08-21 ENCOUNTER — TELEPHONE (OUTPATIENT)
Dept: DIABETES | Facility: CLINIC | Age: 24
End: 2024-08-21
Payer: COMMERCIAL

## 2024-08-21 ENCOUNTER — CLINICAL SUPPORT (OUTPATIENT)
Dept: DIABETES | Facility: CLINIC | Age: 24
End: 2024-08-21
Payer: COMMERCIAL

## 2024-08-21 DIAGNOSIS — E10.65 TYPE 1 DIABETES MELLITUS WITH HYPERGLYCEMIA: Primary | ICD-10-CM

## 2024-08-21 PROCEDURE — G0108 DIAB MANAGE TRN  PER INDIV: HCPCS | Mod: 95,,, | Performed by: DIETITIAN, REGISTERED

## 2024-08-21 NOTE — TELEPHONE ENCOUNTER
----- Message from Angela Mata RD, CDE sent at 8/21/2024 10:39 AM CDT -----  Regarding: pump setting adjustments  I am meeting with Gracie today at 12:30pm. I just sent you her Nikky report. Do you have any setting adjustments you would like to make? ISF and goal BG?

## 2024-08-21 NOTE — PROGRESS NOTES
Diabetes Care Specialist Progress Note  Author: Angela Mata RD, CDE  Date: 8/21/2024    Diabetes Care Specialist Virtual Visit Note       The patient location is: Home in [place]  The chief complaint leading to consultation is: Diabetes  Visit type: audiovisual  Total time spent with patient: 25 min   Each patient to whom he or she provides medical services by telemedicine is:  (1) informed of the relationship between the physician and patient and the respective role of any other health care provider with respect to management of the patient; and (2) notified that he or she may decline to receive medical services by telemedicine and may withdraw from such care at any time.      Intake    Program Intake  Reason for Diabetes Program Visit:: Intervention  Type of Intervention:: Individual  Individual: Education  Education: Self-Management Skill Review (OP5 f/u)  Current diabetes risk level:: moderate  Permission to speak with others about care:: no    Current Diabetes Treatment: Insulin  Method of insulin delivery?: Insulin Pump  Type of Pump: Omnipod 5  Does patient have back-up plan?: Yes  Any problems obtaining supplies?: No    Continuous Glucose Monitoring  Patient has CGM: Yes  Personal CGM type:: Dexcom G7  GMI Date: 08/21/24  GMI Value: 9.5 %    Lab Results   Component Value Date    HGBA1C 11.4 (H) 08/01/2024       Diabetes Self-Management Skills Assessment    Medication Skills Assessment  Patient is able to identify current diabetes medications, dosages, and appropriate timing of medications.: yes  Patient reports problems or concerns with current medication regimen.: no  Medication Skills Assessment Completed:: Yes    Nutrition/Healthy Eating  Meal Plan 24 Hour Recall - Breakfast: boudin balls  Meal Plan 24 Hour Recall - Lunch: frozen dinner (53g of carbs)  Meal Plan 24 Hour Recall - Dinner: nothing  Meal Plan 24 Hour Recall - Beverage: water  Patient can identify foods that impact blood sugar.:  yes  Nutrition/Healthy Eating Skills Assessment Completed:: Yes  Assessment indicates:: Adequate understanding  Area of need?: No    Home Blood Glucose Monitoring  Patient states that blood sugar is checked at home daily.: yes  Monitoring Method:: personal continuous glucose monitor  Personal CGM type:: Dexcom G7   What is your current Time in Range?: 18%  Home Blood Glucose Monitoring Skills Assessment Completed: : Yes  Assessment indicates:: Adequate understanding  Area of need?: No        Assessment Summary and Plan    Based on today's diabetes care assessment, the following areas of need were identified:          8/21/2024    12:01 AM   Areas of Need   Nutrition/Healthy Eating No   Home Blood Glucose Monitoring No       Today's interventions were provided through individual discussion, instruction, and written materials were provided.      Patient verbalized understanding of instruction and written materials.  Pt was able to return back demonstration of instructions today. Patient understood key points, needs reinforcement and further instruction.     Diabetes Self-Management Care Plan:    Today's Diabetes Self-Management Care Plan was developed with Gracie's input. Janethre has agreed to work toward the following goal(s) to improve his/her overall diabetes control.      Care Plan: Diabetes Management   Updates made since 7/22/2024 12:00 AM        Problem: Medications         Goal: Patient will discuss insulin pump choice with Diabetes NEHEMIAH at next week's visit. Completed 8/5/2024   Start Date: 6/11/2024   Expected End Date: 6/17/2024   This Visit's Progress: Met   Priority: High   Barriers: No Barriers Identified   Note:    Patient decided on OP5.        Goal: Patient will use Omnipod 5 to deliver insulin.    Start Date: 8/5/2024   Expected End Date: 11/5/2024   This Visit's Progress: On track   Priority: High   Barriers: No Barriers Identified   Note:    8/21/24: patient reports doing well with Omnipod 5  system. She is not having any issues with changing pods. TIR has improved but BG is still over 400 at times. She has been sick which she feels is contributing to high readings.   Changes made today to settings (per Pauly German):     Decrease AIT to 3 hours. Adjust carb ratio to 13 from 15. ISF to 45 from 55. And Decrease targets to 120           Task: Trained patient to use Omnipod 5 in Automated Mode. Completed 8/5/2024   Note:    OMNIPOD 5 INSULIN PUMP START  Pump training was provided per Omni Pod protocol.     Patient understands she will no longer take Lantus injections daily.  Details of pump therapy were covered included following: controller features and programming, pod activation, pod site selection and rotation, automatic pod priming and insertion, setting & editing basal rates in manual mode, giving bolus and other features in the set up menu.  Patient demonstrated ability to program controller, activate and insert pod using aseptic technique.  Patient demonstrated ability to program Dexcom transmitter into controller and start automated limited mode.    Instructed pt on use of basic pump features ie...give a bolus, pause insulin, switch from manual to automated mode.  Reviewed features available in manual mode verses automated mode.   Reviewed when and how to use activity function in automated mode.  Reviewed site selection of pods, rotation of sites and hard stop to change pod every 72 hrs.   Instructed that insulin vial is good out of refrigeration for up to 28-30 days .   Reviewed treatment of hypoglycemia, hyperglycemia; sick day care, DKA, and troubleshooting of pump.  Omni Pod 24 hour support can be reached at [phone].     INITIAL SETTINGS: (per provider Pauly German)    Basal rate: .65/hr  Maximum basal: 4.0u/hr     Bolus Menu:  ISF: 1:55  Carb Ratio : 1:15  Blood glucose target: 130; correct above: 130  Active insulin: 4 hrs  Maximum bolus = 20 units  Reverse Correction:  OFF    Low pod insulin: 10 units  Pod expiration alarm:  4 hours    Podder ID username: Gracie Sher username: [email or web address]    Patient has written materials for Omnipod 5 for home use.  Patient verbalized understanding of all instructions given.  Reviewed back up plan in case of pump malfunction.  Educated that if glucose levels increasing and patient is delivering insulin, it is recommended to change pod.           Problem: Healthy Eating         Goal: Patient will carb count at meals and snacks. Completed 8/21/2024   Start Date: 6/11/2024   Expected End Date: 12/11/2024   This Visit's Progress: Met   Recent Progress: On track   Priority: Medium   Barriers: No Barriers Identified   Note:    8/5/24: Patient reports being more aware of carbs she is eating.     8/21/24: Patient reports using food labels and food lists to count carbs. She reports being good at math and doesn't find calculating total carbs to be difficult.            Follow Up Plan     Follow up if symptoms worsen or fail to improve, for E10.65.    Today's care plan and follow up schedule was discussed with patient.  Eliodanielitoantonina verbalized understanding of the care plan, goals, and agrees to follow up plan.        The patient was encouraged to communicate with his/her health care provider/physician and care team regarding his/her condition(s) and treatment.  I provided the patient with my contact information today and encouraged to contact me via phone or [facility] Patient Portal as needed.     Length of Visit   Total Time: 25 Minutes

## 2024-08-26 ENCOUNTER — OFFICE VISIT (OUTPATIENT)
Dept: DIABETES | Facility: CLINIC | Age: 24
End: 2024-08-26
Payer: COMMERCIAL

## 2024-08-26 DIAGNOSIS — E10.65 TYPE 1 DIABETES MELLITUS WITH HYPERGLYCEMIA: Primary | ICD-10-CM

## 2024-08-26 PROBLEM — E10.11 DIABETIC KETOACIDOSIS WITH COMA ASSOCIATED WITH TYPE 1 DIABETES MELLITUS: Status: RESOLVED | Noted: 2018-11-28 | Resolved: 2024-08-26

## 2024-08-26 PROCEDURE — 99214 OFFICE O/P EST MOD 30 MIN: CPT | Mod: 95,,, | Performed by: NURSE PRACTITIONER

## 2024-08-26 PROCEDURE — 3046F HEMOGLOBIN A1C LEVEL >9.0%: CPT | Mod: CPTII,95,, | Performed by: NURSE PRACTITIONER

## 2024-08-26 PROCEDURE — G2211 COMPLEX E/M VISIT ADD ON: HCPCS | Mod: 95,,, | Performed by: NURSE PRACTITIONER

## 2024-08-26 PROCEDURE — 95251 CONT GLUC MNTR ANALYSIS I&R: CPT | Mod: NDTC,,, | Performed by: NURSE PRACTITIONER

## 2024-08-26 PROCEDURE — 3066F NEPHROPATHY DOC TX: CPT | Mod: CPTII,95,, | Performed by: NURSE PRACTITIONER

## 2024-08-26 PROCEDURE — 4010F ACE/ARB THERAPY RXD/TAKEN: CPT | Mod: CPTII,95,, | Performed by: NURSE PRACTITIONER

## 2024-08-26 PROCEDURE — 3061F NEG MICROALBUMINURIA REV: CPT | Mod: CPTII,95,, | Performed by: NURSE PRACTITIONER

## 2024-08-26 NOTE — PATIENT INSTRUCTIONS
PATIENT INSTRUCTIONS      INSULIN PUMP SETTINGS:    Insulin pump type: Omnipod  Insulin Type: Humalog  Basal rate: 0.65 Units/hour   Insulin to Carbohydrate Ratio:  - CHANGE TO .  Insulin Sensitivity Factor:   - DECREASE TO 40  Glucose Target: 130-120 mg/dl - DECREASE -110.  Active Insulin Time: 3 hours - DECREASE TO 2 HOURS    G7 CGM    Blood Sugar Goals:       Fastin-130.       1-2 hours after a meal: Less than 180.

## 2024-08-26 NOTE — PROGRESS NOTES
The patient location is: Home  The chief complaint leading to consultation is: Diabetes    Visit type: audiovisual    Face to Face time with patient: 15  30 minutes of total time spent on the encounter, which includes face to face time and non-face to face time preparing to see the patient (eg, review of tests), Obtaining and/or reviewing separately obtained history, Documenting clinical information in the electronic or other health record, Independently interpreting results (not separately reported) and communicating results to the patient/family/caregiver, or Care coordination (not separately reported).  Each patient to whom he or she provides medical services by telemedicine is:  (1) informed of the relationship between the physician and patient and the respective role of any other health care provider with respect to management of the patient; and (2) notified that he or she may decline to receive medical services by telemedicine and may withdraw from such care at any time.    Notes:    Gracie Irizarry is a 24 y.o. female who presents for a follow up evaluation of Type 1 diabetes mellitus.     CHIEF COMPLAINT: Diabetes Consultation    PCP: Kobi Martin MD      Initial visit with me - 5/27/2024    The patient was initially diagnosed with diabetes at age 7.   Followed by Dr. Jayshree barnett prior to aging out of peds clinic.   Patient seen once by Dr. Jasmine at [facility] in February of 2023 which was last encounter with an endocrinologist.   Numerous admission for DKA.   Never been on a insulin pump.     Previous failed treatments include:  Dexcom G6      Social Documentation:  Patient lives in [place] with mom and dad.   Occupation: works at a day care. Working 7am-6pm M-F.      Diabetes related complications:   nephropathy, retinopathy, and autonomic neuropathy, Mauriac syndrome  denies Pancreatitis  denies Gastroparesis  reports DKA  denies Hx/family Hx of MEN2/MTC  denies Frequent UTIs/yeast  "infections     Cardiovascular Risk Factors: dyslipidemia, hypertension, and microalbuminuria.    Diabetes Medications               BAQSIMI 3 mg/actuation Spry SMARTSI Spray(s) Both Nares PRN    insulin lispro (HUMALOG U-100 INSULIN) 100 unit/mL injection Inject 200 Units into the skin Every 3 (three) days. VIA OMNIPOD 5 INSULIN PUMP    LANTUS SOLOSTAR U-100 INSULIN 100 unit/mL (3 mL) InPn pen Inject 26 Units into the skin once daily.     Current monitoring regimen:  Dexcom G7 CGM    The patient's Dexcom CGM was downloaded and reviewed. For 14 days, patient average glucose was 256 mg/dL. She was above range 81% of the time, in range 19% of the time, and below range 0% of the time. The target range for this patient was 70 - 180 mg/dL. Overall, there was a pattern of hyperglycemia.        Patient currently taking insulin or sulfonylurea?  Yes. Emergency Glucagon Prescription current? yes  Recent hypoglycemic episodes: No.     Patient compliant with glucose checks and medication administration? Yes    DIABETES MANAGEMENT STATUS  Statin: Taking  ACE/ARB: Taking  Screening or Prevention Patient's value Goal Complete/Controlled?   HgA1C Testing and Control   Lab Results   Component Value Date    HGBA1C 11.4 (H) 2024      Annually/Less than 8% No   Lipid profile : 2024 Annually No   LDL control Lab Results   Component Value Date    LDLCALC 154.6 2024    Annually/Less than 100 mg/dl  Yes   Nephropathy screening Lab Results   Component Value Date    LABMICR 13.0 2024     No results found for: "PROTEINUA"  No results found for: "UTPCR"   Annually Yes   Blood pressure BP Readings from Last 1 Encounters:   24 130/88    Less than 140/90 Yes   Dilated retinal exam : 2022 Annually No   Foot exam   : 2024 Annually No   Patient's medications, allergies, surgical, social and family histories were reviewed and updated as appropriate.     Review of Systems   Constitutional:  Negative for " "weight loss.   Eyes:  Negative for blurred vision and double vision.   Cardiovascular:  Negative for chest pain.   Gastrointestinal:  Negative for nausea and vomiting.   Genitourinary:  Negative for frequency.   Musculoskeletal:  Negative for falls.   Neurological:  Negative for dizziness and weakness.   Endo/Heme/Allergies:  Negative for polydipsia.   Psychiatric/Behavioral:  Negative for depression.    All other systems reviewed and are negative.       Physical Exam  Constitutional:       Appearance: Normal appearance.   HENT:      Head: Normocephalic and atraumatic.   Pulmonary:      Effort: No respiratory distress.   Musculoskeletal:      Cervical back: Normal range of motion.   Neurological:      Mental Status: She is alert and oriented to person, place, and time.   Psychiatric:         Mood and Affect: Mood normal.         Behavior: Behavior normal.        There were no vitals taken for this visit.  Wt Readings from Last 3 Encounters:   05/24/24 62.3 kg (137 lb 5.6 oz)   04/04/24 64.1 kg (141 lb 6.8 oz)       LAB REVIEW  Lab Results   Component Value Date     08/01/2024    K 4.2 08/01/2024     08/01/2024    CO2 21 (L) 08/01/2024    BUN 15 08/01/2024    CREATININE 0.8 08/01/2024    CALCIUM 10.0 08/01/2024    ANIONGAP 13 08/01/2024    EGFRNORACEVR >60.0 08/01/2024     No results found for: "CPEPTIDE", "GLUTAMICACID", "INSLNABS"  Hemoglobin A1C   Date Value Ref Range Status   08/01/2024 11.4 (H) 4.0 - 5.6 % Final     Comment:     ADA Screening Guidelines:  5.7-6.4%  Consistent with prediabetes  >or=6.5%  Consistent with diabetes    High levels of fetal hemoglobin interfere with the HbA1C  assay. Heterozygous hemoglobin variants (HbS, HgC, etc)do  not significantly interfere with this assay.   However, presence of multiple variants may affect accuracy.     04/04/2024 10.8 (H) 4.0 - 5.6 % Final     Comment:     ADA Screening Guidelines:  5.7-6.4%  Consistent with prediabetes  >or=6.5%  Consistent with " diabetes    High levels of fetal hemoglobin interfere with the HbA1C  assay. Heterozygous hemoglobin variants (HbS, HgC, etc)do  not significantly interfere with this assay.   However, presence of multiple variants may affect accuracy.     01/10/2024 11.2 (H) <=6.5 % Final   09/15/2023 10.1 (H) 4.8 - 5.6 % Final     Comment:              Prediabetes: 5.7 - 6.4           Diabetes: >6.4           Glycemic control for adults with diabetes: <7.0   2023 9.7 (H) 4.8 - 5.6 % Final     Comment:              Prediabetes: 5.7 - 6.4           Diabetes: >6.4           Glycemic control for adults with diabetes: <7.0        ASSESSMENT    ICD-10-CM ICD-9-CM   1. Type 1 diabetes mellitus with hyperglycemia  E10.65 250.01       PLAN  Diagnoses and all orders for this visit:    Type 1 diabetes mellitus with hyperglycemia        Reviewed pathophysiology of diabetes, complications related to the disease, importance of annual dilated eye exam and daily foot examination. Explained MOA, SE, dosage of medications. Written instructions given and reviewed with patient and patient verbalizes understanding.     2024 - hyperglycemia on cgm, states glucoses higher this week on menstrual period. Did not receive OP5 starter kit with pods. In shipment now.     2024 - Started OP5 on 24. Patient is doing well, still having hyperglycemia. Settings adjusted on 24. Continues to have hyperglycemia, will make adjustments today. F/u 3 weeks    PATIENT INSTRUCTIONS      INSULIN PUMP SETTINGS:    Insulin pump type: Omnipod  Insulin Type: Humalog  Basal rate: 0.65 Units/hour   Insulin to Carbohydrate Ratio:  - CHANGE TO .  Insulin Sensitivity Factor:   - DECREASE TO 40  Glucose Target: 130-120 mg/dl - DECREASE -110.  Active Insulin Time: 3 hours - DECREASE TO 2 HOURS    G7 CGM    Blood Sugar Goals:       Fastin-130.       1-2 hours after a meal: Less than 180.       Follow up in about 3 weeks (around 2024)  for Dexcom, OP5.

## 2024-09-16 ENCOUNTER — OFFICE VISIT (OUTPATIENT)
Dept: DIABETES | Facility: CLINIC | Age: 24
End: 2024-09-16
Payer: COMMERCIAL

## 2024-09-16 DIAGNOSIS — E10.65 TYPE 1 DIABETES MELLITUS WITH HYPERGLYCEMIA: Primary | ICD-10-CM

## 2024-09-16 PROCEDURE — 3046F HEMOGLOBIN A1C LEVEL >9.0%: CPT | Mod: CPTII,95,, | Performed by: NURSE PRACTITIONER

## 2024-09-16 PROCEDURE — 99214 OFFICE O/P EST MOD 30 MIN: CPT | Mod: 95,,, | Performed by: NURSE PRACTITIONER

## 2024-09-16 PROCEDURE — 4010F ACE/ARB THERAPY RXD/TAKEN: CPT | Mod: CPTII,95,, | Performed by: NURSE PRACTITIONER

## 2024-09-16 PROCEDURE — 3061F NEG MICROALBUMINURIA REV: CPT | Mod: CPTII,95,, | Performed by: NURSE PRACTITIONER

## 2024-09-16 PROCEDURE — G2211 COMPLEX E/M VISIT ADD ON: HCPCS | Mod: 95,,, | Performed by: NURSE PRACTITIONER

## 2024-09-16 PROCEDURE — 95251 CONT GLUC MNTR ANALYSIS I&R: CPT | Mod: NDTC,,, | Performed by: NURSE PRACTITIONER

## 2024-09-16 PROCEDURE — 3066F NEPHROPATHY DOC TX: CPT | Mod: CPTII,95,, | Performed by: NURSE PRACTITIONER

## 2024-09-16 NOTE — PROGRESS NOTES
The patient location is: Home  The chief complaint leading to consultation is: Diabetes    Visit type: audiovisual    Face to Face time with patient: 15  30 minutes of total time spent on the encounter, which includes face to face time and non-face to face time preparing to see the patient (eg, review of tests), Obtaining and/or reviewing separately obtained history, Documenting clinical information in the electronic or other health record, Independently interpreting results (not separately reported) and communicating results to the patient/family/caregiver, or Care coordination (not separately reported).  Each patient to whom he or she provides medical services by telemedicine is:  (1) informed of the relationship between the physician and patient and the respective role of any other health care provider with respect to management of the patient; and (2) notified that he or she may decline to receive medical services by telemedicine and may withdraw from such care at any time.    Notes:    Gracie Irizarry is a 24 y.o. female who presents for a follow up evaluation of Type 1 diabetes mellitus.     CHIEF COMPLAINT: Diabetes Consultation    PCP: Kobi Martin MD      Initial visit with me - 5/27/2024    The patient was initially diagnosed with diabetes at age 7.   Followed by Dr. Jayshree barnett prior to aging out of peds clinic.   Patient seen once by Dr. Jasmine at [facility] in February of 2023 which was last encounter with an endocrinologist.   Numerous admission for DKA.   Never been on a insulin pump.     Previous failed treatments include:  Dexcom G6      Social Documentation:  Patient lives in [place] with mom and dad.   Occupation: works at a day care. Working 7am-6pm M-F.      Diabetes related complications:   nephropathy, retinopathy, and autonomic neuropathy, Mauriac syndrome  denies Pancreatitis  denies Gastroparesis  reports DKA  denies Hx/family Hx of MEN2/MTC  denies Frequent UTIs/yeast  "infections     Cardiovascular Risk Factors: dyslipidemia, hypertension, and microalbuminuria.    Diabetes Medications               BAQSIMI 3 mg/actuation Spry SMARTSI Spray(s) Both Nares PRN    insulin lispro (HUMALOG U-100 INSULIN) 100 unit/mL injection Inject 200 Units into the skin Every 3 (three) days. VIA OMNIPOD 5 INSULIN PUMP    LANTUS SOLOSTAR U-100 INSULIN 100 unit/mL (3 mL) InPn pen Inject 26 Units into the skin once daily.     Current monitoring regimen:  Dexcom G7 CGM    The patient's Dexcom CGM was downloaded and reviewed. For 14 days, patient average glucose was 283 mg/dL. She was above range 83% of the time, in range 17% of the time, and below range 0% of the time. The target range for this patient was 70 - 180 mg/dL. Overall, there was a pattern of hyperglycemia in manual mode and post prandial hyperglycemia.         Patient currently taking insulin or sulfonylurea?  Yes. Emergency Glucagon Prescription current? yes  Recent hypoglycemic episodes: No.     Patient compliant with glucose checks and medication administration? Yes    DIABETES MANAGEMENT STATUS  Statin: Taking  ACE/ARB: Taking  Screening or Prevention Patient's value Goal Complete/Controlled?   HgA1C Testing and Control   Lab Results   Component Value Date    HGBA1C 11.4 (H) 2024      Annually/Less than 8% No   Lipid profile : 2024 Annually No   LDL control Lab Results   Component Value Date    LDLCALC 154.6 2024    Annually/Less than 100 mg/dl  Yes   Nephropathy screening Lab Results   Component Value Date    LABMICR 13.0 2024     No results found for: "PROTEINUA"  No results found for: "UTPCR"   Annually Yes   Blood pressure BP Readings from Last 1 Encounters:   24 130/88    Less than 140/90 Yes   Dilated retinal exam : 2022 Annually No   Foot exam   : 2024 Annually No   Patient's medications, allergies, surgical, social and family histories were reviewed and updated as appropriate. " "    Review of Systems   Constitutional:  Negative for weight loss.   Eyes:  Negative for blurred vision and double vision.   Cardiovascular:  Negative for chest pain.   Gastrointestinal:  Negative for nausea and vomiting.   Genitourinary:  Negative for frequency.   Musculoskeletal:  Negative for falls.   Neurological:  Negative for dizziness and weakness.   Endo/Heme/Allergies:  Negative for polydipsia.   Psychiatric/Behavioral:  Negative for depression.    All other systems reviewed and are negative.       Physical Exam  Constitutional:       Appearance: Normal appearance.   HENT:      Head: Normocephalic and atraumatic.   Pulmonary:      Effort: No respiratory distress.   Musculoskeletal:      Cervical back: Normal range of motion.   Neurological:      Mental Status: She is alert and oriented to person, place, and time.   Psychiatric:         Mood and Affect: Mood normal.         Behavior: Behavior normal.        There were no vitals taken for this visit.  Wt Readings from Last 3 Encounters:   05/24/24 62.3 kg (137 lb 5.6 oz)   04/04/24 64.1 kg (141 lb 6.8 oz)       LAB REVIEW  Lab Results   Component Value Date     08/01/2024    K 4.2 08/01/2024     08/01/2024    CO2 21 (L) 08/01/2024    BUN 15 08/01/2024    CREATININE 0.8 08/01/2024    CALCIUM 10.0 08/01/2024    ANIONGAP 13 08/01/2024    EGFRNORACEVR >60.0 08/01/2024     No results found for: "CPEPTIDE", "GLUTAMICACID", "INSLNABS"  Hemoglobin A1C   Date Value Ref Range Status   08/01/2024 11.4 (H) 4.0 - 5.6 % Final     Comment:     ADA Screening Guidelines:  5.7-6.4%  Consistent with prediabetes  >or=6.5%  Consistent with diabetes    High levels of fetal hemoglobin interfere with the HbA1C  assay. Heterozygous hemoglobin variants (HbS, HgC, etc)do  not significantly interfere with this assay.   However, presence of multiple variants may affect accuracy.     04/04/2024 10.8 (H) 4.0 - 5.6 % Final     Comment:     ADA Screening Guidelines:  5.7-6.4%  " Consistent with prediabetes  >or=6.5%  Consistent with diabetes    High levels of fetal hemoglobin interfere with the HbA1C  assay. Heterozygous hemoglobin variants (HbS, HgC, etc)do  not significantly interfere with this assay.   However, presence of multiple variants may affect accuracy.     01/10/2024 11.2 (H) <=6.5 % Final   09/15/2023 10.1 (H) 4.8 - 5.6 % Final     Comment:              Prediabetes: 5.7 - 6.4           Diabetes: >6.4           Glycemic control for adults with diabetes: <7.0   07/13/2023 9.7 (H) 4.8 - 5.6 % Final     Comment:              Prediabetes: 5.7 - 6.4           Diabetes: >6.4           Glycemic control for adults with diabetes: <7.0        ASSESSMENT    ICD-10-CM ICD-9-CM   1. Type 1 diabetes mellitus with hyperglycemia  E10.65 250.01       PLAN  Diagnoses and all orders for this visit:    Type 1 diabetes mellitus with hyperglycemia        Reviewed pathophysiology of diabetes, complications related to the disease, importance of annual dilated eye exam and daily foot examination. Explained MOA, SE, dosage of medications. Written instructions given and reviewed with patient and patient verbalizes understanding.     7/29/2024 - hyperglycemia on cgm, states glucoses higher this week on menstrual period. Did not receive OP5 starter kit with pods. In shipment now.     8/26/2024 - Started OP5 on 8/5/24. Patient is doing well, still having hyperglycemia. Settings adjusted on 8/21/24. Continues to have hyperglycemia, will make adjustments today. F/u 3 weeks    9/16/2024 - very high hyperglycemia when in manual mode, will increase basal to 0.8 units/hour and adjust carb ratio to 1/9 due to continued post prandial hyperglycemia with well timed carb boluses.    PATIENT INSTRUCTIONS      INSULIN PUMP SETTINGS:    Insulin pump type: Omnipod  Insulin Type: Humalog  Basal rate: 0.65 Units/hour - Increase to 0.8 units/hour  Insulin to Carbohydrate Ratio: 1/11 - CHANGE TO 1/9.  Insulin Sensitivity  Factor:    Glucose Target: 120-110 mg/dl    Active Insulin Time: 2 hours     G7 CGM    Blood Sugar Goals:       Fastin-130.       1-2 hours after a meal: Less than 180.       Follow up in about 4 weeks (around 10/14/2024) for Virtual, Dexcom, OP5.

## 2024-09-16 NOTE — PATIENT INSTRUCTIONS
PATIENT INSTRUCTIONS      INSULIN PUMP SETTINGS:    Insulin pump type: Omnipod  Insulin Type: Humalog  Basal rate: 0.65 Units/hour - Increase to 0.8 units/hour  Insulin to Carbohydrate Ratio:  - CHANGE TO .  Insulin Sensitivity Factor: 1/40   Glucose Target: 120-110 mg/dl    Active Insulin Time: 2 hours     G7 CGM    Blood Sugar Goals:       Fastin-130.       1-2 hours after a meal: Less than 180.

## 2024-09-18 ENCOUNTER — TELEPHONE (OUTPATIENT)
Dept: FAMILY MEDICINE | Facility: CLINIC | Age: 24
End: 2024-09-18
Payer: COMMERCIAL

## 2024-10-02 ENCOUNTER — PATIENT OUTREACH (OUTPATIENT)
Dept: ADMINISTRATIVE | Facility: HOSPITAL | Age: 24
End: 2024-10-02
Payer: COMMERCIAL

## 2024-11-14 LAB
LEFT EYE DM RETINOPATHY: POSITIVE
RIGHT EYE DM RETINOPATHY: POSITIVE

## 2024-11-15 ENCOUNTER — PATIENT OUTREACH (OUTPATIENT)
Dept: ADMINISTRATIVE | Facility: HOSPITAL | Age: 24
End: 2024-11-15
Payer: COMMERCIAL

## 2025-01-29 ENCOUNTER — PATIENT OUTREACH (OUTPATIENT)
Dept: ADMINISTRATIVE | Facility: HOSPITAL | Age: 25
End: 2025-01-29
Payer: COMMERCIAL

## 2025-01-30 ENCOUNTER — PATIENT MESSAGE (OUTPATIENT)
Dept: ADMINISTRATIVE | Facility: HOSPITAL | Age: 25
End: 2025-01-30
Payer: COMMERCIAL

## 2025-03-05 DIAGNOSIS — E10.65 TYPE 1 DIABETES MELLITUS WITH HYPERGLYCEMIA: ICD-10-CM

## 2025-03-06 RX ORDER — INSULIN LISPRO 100 [IU]/ML
INJECTION, SOLUTION INTRAVENOUS; SUBCUTANEOUS
Qty: 60 ML | Refills: 3 | OUTPATIENT
Start: 2025-03-06

## 2025-03-26 ENCOUNTER — OFFICE VISIT (OUTPATIENT)
Dept: PODIATRY | Facility: CLINIC | Age: 25
End: 2025-03-26
Payer: COMMERCIAL

## 2025-03-26 ENCOUNTER — HOSPITAL ENCOUNTER (OUTPATIENT)
Dept: RADIOLOGY | Facility: HOSPITAL | Age: 25
Discharge: HOME | End: 2025-03-26
Attending: PODIATRIST
Payer: COMMERCIAL

## 2025-03-26 VITALS — WEIGHT: 137.38 LBS | HEIGHT: 63 IN | BODY MASS INDEX: 24.34 KG/M2

## 2025-03-26 DIAGNOSIS — M21.962 ACQUIRED DEFORMITY OF FOOT, LEFT: ICD-10-CM

## 2025-03-26 DIAGNOSIS — M25.572 PAIN IN LEFT ANKLE AND JOINTS OF LEFT FOOT: ICD-10-CM

## 2025-03-26 DIAGNOSIS — M76.822 POSTERIOR TIBIAL TENDON DYSFUNCTION (PTTD) OF LEFT LOWER EXTREMITY: Primary | ICD-10-CM

## 2025-03-26 DIAGNOSIS — M76.822 POSTERIOR TIBIAL TENDON DYSFUNCTION (PTTD) OF LEFT LOWER EXTREMITY: ICD-10-CM

## 2025-03-26 DIAGNOSIS — E10.65 TYPE 1 DIABETES MELLITUS WITH HYPERGLYCEMIA: ICD-10-CM

## 2025-03-26 PROCEDURE — 3008F BODY MASS INDEX DOCD: CPT | Mod: CPTII,S$GLB,, | Performed by: PODIATRIST

## 2025-03-26 PROCEDURE — 1160F RVW MEDS BY RX/DR IN RCRD: CPT | Mod: CPTII,S$GLB,, | Performed by: PODIATRIST

## 2025-03-26 PROCEDURE — 99214 OFFICE O/P EST MOD 30 MIN: CPT | Mod: S$GLB,,, | Performed by: PODIATRIST

## 2025-03-26 PROCEDURE — 99999 PR PBB SHADOW E&M-EST. PATIENT-LVL III: CPT | Mod: PBBFAC,,, | Performed by: PODIATRIST

## 2025-03-26 PROCEDURE — 1159F MED LIST DOCD IN RCRD: CPT | Mod: CPTII,S$GLB,, | Performed by: PODIATRIST

## 2025-03-26 PROCEDURE — 73630 X-RAY EXAM OF FOOT: CPT | Mod: 26,LT,, | Performed by: STUDENT IN AN ORGANIZED HEALTH CARE EDUCATION/TRAINING PROGRAM

## 2025-03-26 PROCEDURE — 73630 X-RAY EXAM OF FOOT: CPT | Mod: TC,LT

## 2025-03-26 RX ORDER — NAPROXEN 500 MG/1
500 TABLET ORAL 2 TIMES DAILY WITH MEALS
Qty: 28 TABLET | Refills: 0 | Status: SHIPPED | OUTPATIENT
Start: 2025-03-26 | End: 2025-04-09

## 2025-03-26 NOTE — PROGRESS NOTES
"Subjective:       Patient ID: Gracie Irizarry is a 25 y.o. female.    Chief Complaint: Foot Pain (Left foot pain, rate pain 8/10, diabetic pt, pt is wearing tennis shoes and socks )      HPI: Gracie Irizarry presents to the office with the chief complaint of pains to the left foot and ankle. Pains are most notable at the medial aspect of the foot and ankle. The pains are rated as 8/10 and are described as achy and sharp in nature. The pains have been on going now for the past several weeks to a month or so, and are worsening. The patient states no trauma. The patient states no NSAIDs for management. The patient states that prolonged walking and standing exacerbates and causes the symptoms. The patient does state mild associated swelling as well. Patient's Primary Care Provider is Kobi Martin MD.     Review of patient's allergies indicates:   Allergen Reactions    Lisinopril Swelling       Past Medical History:   Diagnosis Date    Diabetes mellitus type I     GERD (gastroesophageal reflux disease)        No family history on file.    Social History[1]    Past Surgical History:   Procedure Laterality Date    CHOLECYSTECTOMY      TONSILLECTOMY         Review of Systems   Constitutional:  Negative for chills, fatigue and fever.   HENT:  Negative for hearing loss.    Eyes:  Negative for photophobia and visual disturbance.   Respiratory:  Negative for cough, chest tightness, shortness of breath and wheezing.    Cardiovascular:  Negative for chest pain and palpitations.   Gastrointestinal:  Negative for constipation, diarrhea, nausea and vomiting.   Endocrine: Negative for cold intolerance and heat intolerance.   Genitourinary:  Negative for flank pain.   Musculoskeletal:  Positive for gait problem. Negative for neck pain and neck stiffness.   Neurological:  Negative for light-headedness and headaches.   Psychiatric/Behavioral:  Negative for sleep disturbance.          Objective:   Ht 5' 3" (1.6 m)   Wt 62.3 kg " (137 lb 5.6 oz)   BMI 24.33 kg/m²     No image results found.      Physical Exam  LOWER EXTREMITY PHYSICAL EXAMINATION  ORTHOPEDIC: There is moderate to severe collapsing pes planovalgus on the left foot and ankle. There is moderaet tenderness to palpation of the navicular tuberosity on the left foot and ankle. There is mild edema noted along the course of the PT tendon on the left foot and ankle. There is mild retro-malleolar edema (medial malleolus). There is no apparent pains to palpation of the medial malleolus.  Equinus contracture is noted.  No pain with palpation and/or range of motion of the ankle joint.  There is no crepitus noted with range of motion of the ankle joint. The ankle is not in valgus and the RCSP is neutral.  There is not limited ROM of the STJ and the MTJ. The hindfoot is not in valgus. Upon standing, there is moderate olive-talar subluxation noted on the left foot and ankle. There is moderate forefoot/midfoot abduction on the hindfoot. The patient is able to double heel rise, but has difficulties with single heel rise on the left foot. Gait pattern is antalgic at this time.     Assessment:     1. Posterior tibial tendon dysfunction (PTTD) of left lower extremity    2. Acquired deformity of foot, left    3. Pain in left ankle and joints of left foot    4. Type 1 diabetes mellitus with hyperglycemia        Plan:     Posterior tibial tendon dysfunction (PTTD) of left lower extremity  -     naproxen (NAPROSYN) 500 MG tablet; Take 1 tablet (500 mg total) by mouth 2 (two) times daily with meals. for 14 days  Dispense: 28 tablet; Refill: 0  -     X-Ray Foot Complete Left; Future; Expected date: 03/26/2025    Acquired deformity of foot, left  -     naproxen (NAPROSYN) 500 MG tablet; Take 1 tablet (500 mg total) by mouth 2 (two) times daily with meals. for 14 days  Dispense: 28 tablet; Refill: 0  -     X-Ray Foot Complete Left; Future; Expected date: 03/26/2025    Pain in left ankle and joints of  left foot  -     naproxen (NAPROSYN) 500 MG tablet; Take 1 tablet (500 mg total) by mouth 2 (two) times daily with meals. for 14 days  Dispense: 28 tablet; Refill: 0  -     X-Ray Foot Complete Left; Future; Expected date: 03/26/2025    Type 1 diabetes mellitus with hyperglycemia        Thorough discussion is had with the patient today, concerning the diagnosis, its etiology, and the treatment algorithm at present.     Most recent labs reviewed in detail with the patient. All questions and concerns regarding findings and its/their implications are outlined and discussed.    Obtain XR.    Start NSAIDs BID for 14 days.    Start CAM Walker.    CAM Walker (Walking Boot), short/tall is dispensed to the patient. The CAM Walker is appropriately fitted and customized to the patient's lower extremity physique by the LPN/MA/Ortho Tech. Patient to ambulate with the CAM Walker at all times. The patient should not sleep with the device or shower with the device, or drive with the device (if dispensed for right ankle/foot pathology).           No future appointments.               [1]   Social History  Socioeconomic History    Marital status: Single   Tobacco Use    Smoking status: Never     Passive exposure: Never    Smokeless tobacco: Never   Substance and Sexual Activity    Alcohol use: Never    Drug use: Never    Sexual activity: Never     Social Drivers of Health     Financial Resource Strain: Low Risk  (9/15/2023)    Received from [facility] and Its Subsidiaries and Affiliates    Overall Financial Resource Strain (CARDIA)     Difficulty of Paying Living Expenses: Not hard at all   Food Insecurity: No Food Insecurity (1/10/2024)    Received from [employer] [facility] and Its Subsidiaries and Affiliates    Hunger Vital Sign     Worried About Running Out of Food in the Last Year: Never true     Ran Out of Food in the Last Year: Never true   Transportation Needs:  No Transportation Needs (1/10/2024)    [facility]    PRAPARE - Transportation     Lack of Transportation (Medical): No     Lack of Transportation (Non-Medical): No   Physical Activity: Inactive (9/15/2023)    [facility]    Exercise Vital Sign     Days of Exercise per Week: 0 days     Minutes of Exercise per Session: 0 min   Stress: No Stress Concern Present (9/15/2023)    Received from [facility] and Its Subsidiaries and Affiliates    [employer] - Occupational Stress Questionnaire     Feeling of Stress : Not at all   Housing Stability: Low Risk  (1/10/2024)    [facility]    Housing Stability Vital Sign     Unable to Pay for Housing in the Last Year: No     Number of Places Lived in the Last Year: 1     Unstable Housing in the Last Year: No

## 2025-04-10 ENCOUNTER — OFFICE VISIT (OUTPATIENT)
Dept: PODIATRY | Facility: CLINIC | Age: 25
End: 2025-04-10
Payer: COMMERCIAL

## 2025-04-10 DIAGNOSIS — E10.65 TYPE 1 DIABETES MELLITUS WITH HYPERGLYCEMIA: ICD-10-CM

## 2025-04-10 DIAGNOSIS — M25.572 PAIN IN LEFT ANKLE AND JOINTS OF LEFT FOOT: ICD-10-CM

## 2025-04-10 DIAGNOSIS — M21.962 ACQUIRED DEFORMITY OF FOOT, LEFT: ICD-10-CM

## 2025-04-10 DIAGNOSIS — M76.822 POSTERIOR TIBIAL TENDON DYSFUNCTION (PTTD) OF LEFT LOWER EXTREMITY: Primary | ICD-10-CM

## 2025-04-10 PROCEDURE — 99214 OFFICE O/P EST MOD 30 MIN: CPT | Mod: S$GLB,,, | Performed by: PODIATRIST

## 2025-04-10 PROCEDURE — 1160F RVW MEDS BY RX/DR IN RCRD: CPT | Mod: CPTII,S$GLB,, | Performed by: PODIATRIST

## 2025-04-10 PROCEDURE — 1159F MED LIST DOCD IN RCRD: CPT | Mod: CPTII,S$GLB,, | Performed by: PODIATRIST

## 2025-04-10 PROCEDURE — 99999 PR PBB SHADOW E&M-EST. PATIENT-LVL III: CPT | Mod: PBBFAC,,, | Performed by: PODIATRIST

## 2025-04-10 NOTE — PROGRESS NOTES
Subjective:     Patient ID: Gracie Irizarry is a 25 y.o. female.    Chief Complaint: Follow-up (Follow up PTTD, rates pain 3, diabetic, )    HPI: Gracie Irizarry presents to the office for follow up concerning PTTD of the of the LLE. She has been WBAT with [facility] since her last evaluation. Also states NSAIDs. States minimal pains at this time. States minimal to no edema as well. Mother is present. Patient's Primary Care Provider is oKbi Martin MD.     Review of patient's allergies indicates:   Allergen Reactions    Lisinopril Swelling       Past Medical History:   Diagnosis Date    Diabetes mellitus type I     GERD (gastroesophageal reflux disease)        No family history on file.    Social History[1]    Past Surgical History:   Procedure Laterality Date    CHOLECYSTECTOMY      TONSILLECTOMY         Review of Systems   Constitutional:  Negative for chills, fatigue and fever.   HENT:  Negative for hearing loss.    Eyes:  Negative for photophobia and visual disturbance.   Respiratory:  Negative for cough, chest tightness, shortness of breath and wheezing.    Cardiovascular:  Negative for chest pain and palpitations.   Gastrointestinal:  Negative for constipation, diarrhea, nausea and vomiting.   Endocrine: Negative for cold intolerance and heat intolerance.   Genitourinary:  Negative for flank pain.   Musculoskeletal:  Negative for neck pain and neck stiffness.   Neurological:  Negative for light-headedness and headaches.   Psychiatric/Behavioral:  Negative for sleep disturbance.          Objective:   There were no vitals taken for this visit.    X-Ray Foot Complete Left  Narrative: EXAMINATION:  XR FOOT COMPLETE 3 VIEW LEFT    CLINICAL HISTORY:  Posterior tibial tendinitis, left leg    TECHNIQUE:  XR FOOT COMPLETE 3 VIEW LEFT    COMPARISON:  None.    FINDINGS:  No evidence of acute fracture or dislocation.  No radiopaque foreign body seen.  Pes planus.  Impression: As above.    Electronically signed  by: Isai Galindo  Date:    03/26/2025  Time:    13:52      Physical Exam  Constitutional:       Appearance: Normal appearance. She is normal weight.   Eyes:      Conjunctiva/sclera: Conjunctivae normal.   Pulmonary:      Effort: Pulmonary effort is normal.   Skin:     General: Skin is warm and dry.      Capillary Refill: Capillary refill takes less than 2 seconds.   Neurological:      General: No focal deficit present.      Mental Status: She is alert and oriented to person, place, and time. Mental status is at baseline.      Gait: Gait normal.   Psychiatric:         Mood and Affect: Mood normal.         Behavior: Behavior normal.         Thought Content: Thought content normal.         Judgment: Judgment normal.         Assessment:     1. Posterior tibial tendon dysfunction (PTTD) of left lower extremity    2. Acquired deformity of foot, left    3. Pain in left ankle and joints of left foot    4. Type 1 diabetes mellitus with hyperglycemia        Plan:     Posterior tibial tendon dysfunction (PTTD) of left lower extremity    Acquired deformity of foot, left    Pain in left ankle and joints of left foot    Type 1 diabetes mellitus with hyperglycemia      Thorough discussion is had with the patient today, concerning the diagnosis, its etiology, and the treatment algorithm at present.     XRAYS are reviewed in detail with the patient. All questions and concerns regarding findings and its/their implications are outlined and discussed.    Can D/C [facility].    Patient should ambulate with proper and supportive shoe gear.  These are shoes with firm and robust arch support; medial counter.  Shoes which only bend at the metatarsophalangeal joint and which are rigid in the midfoot and hindfoot. Running type or cross training type shoes gear which are designed for pronation control is best.     Prescription is written for Orthotist evaluation at [facility], [address], [place] [address], for lower  extremity orthotic(s) management and/or shoe gear management.          No future appointments.                 [1]   Social History  Socioeconomic History    Marital status: Single   Tobacco Use    Smoking status: Never     Passive exposure: Never    Smokeless tobacco: Never   Substance and Sexual Activity    Alcohol use: Never    Drug use: Never    Sexual activity: Never     Social Drivers of Health     Financial Resource Strain: Low Risk  (9/15/2023)    [facility]    Overall Financial Resource Strain (CARDIA)     Difficulty of Paying Living Expenses: Not hard at all   Food Insecurity: No Food Insecurity (1/10/2024)    [facility]    Hunger Vital Sign     Worried About Running Out of Food in the Last Year: Never true     Ran Out of Food in the Last Year: Never true   Transportation Needs: No Transportation Needs (1/10/2024)    [facility]    PRAPARE - Transportation     Lack of Transportation (Medical): No     Lack of Transportation (Non-Medical): No   Physical Activity: Inactive (9/15/2023)    [facility]    Exercise Vital Sign     Days of Exercise per Week: 0 days     Minutes of Exercise per Session: 0 min   Stress: No Stress Concern Present (9/15/2023)    [facility]    [employer] [employer] [place] [employer] - Occupational Stress Questionnaire     Feeling of Stress : Not at all   Housing Stability: Low Risk  (1/10/2024)    [facility]    Housing Stability Vital Sign     Unable to Pay for  Housing in the Last Year: No     Number of Places Lived in the Last Year: 1     Unstable Housing in the Last Year: No

## 2025-04-11 DIAGNOSIS — E10.65 TYPE 1 DIABETES MELLITUS WITH HYPERGLYCEMIA: ICD-10-CM

## 2025-04-14 RX ORDER — BLOOD-GLUCOSE SENSOR
1 EACH MISCELLANEOUS
Qty: 3 EACH | Refills: 11 | Status: SHIPPED | OUTPATIENT
Start: 2025-04-14 | End: 2026-04-14

## 2025-04-30 ENCOUNTER — PATIENT OUTREACH (OUTPATIENT)
Dept: ADMINISTRATIVE | Facility: HOSPITAL | Age: 25
End: 2025-04-30
Payer: COMMERCIAL

## 2025-04-30 NOTE — PROGRESS NOTES
VBHM Score: 2     Cervical Cancer Screening  Hemoglobin A1c                  Additional Notes:  Attempted to contact the patient to discuss/schedule annual exam with PCP and overdue HM screenings, no answer, no voicemail, campaign message sent last week.

## 2025-05-01 ENCOUNTER — PATIENT MESSAGE (OUTPATIENT)
Dept: ADMINISTRATIVE | Facility: HOSPITAL | Age: 25
End: 2025-05-01
Payer: COMMERCIAL

## 2025-06-02 ENCOUNTER — LAB VISIT (OUTPATIENT)
Dept: LAB | Facility: HOSPITAL | Age: 25
End: 2025-06-02
Attending: NURSE PRACTITIONER
Payer: COMMERCIAL

## 2025-06-02 ENCOUNTER — OFFICE VISIT (OUTPATIENT)
Dept: DIABETES | Facility: CLINIC | Age: 25
End: 2025-06-02
Payer: COMMERCIAL

## 2025-06-02 VITALS
BODY MASS INDEX: 27.54 KG/M2 | HEIGHT: 63 IN | WEIGHT: 155.44 LBS | HEART RATE: 106 BPM | DIASTOLIC BLOOD PRESSURE: 85 MMHG | SYSTOLIC BLOOD PRESSURE: 125 MMHG

## 2025-06-02 DIAGNOSIS — E10.22 TYPE 1 DIABETES MELLITUS WITH STAGE 2 CHRONIC KIDNEY DISEASE: ICD-10-CM

## 2025-06-02 DIAGNOSIS — E10.65 TYPE 1 DIABETES MELLITUS WITH HYPERGLYCEMIA: Primary | ICD-10-CM

## 2025-06-02 DIAGNOSIS — E78.5 DYSLIPIDEMIA DUE TO TYPE 1 DIABETES MELLITUS: ICD-10-CM

## 2025-06-02 DIAGNOSIS — E10.69 DYSLIPIDEMIA DUE TO TYPE 1 DIABETES MELLITUS: ICD-10-CM

## 2025-06-02 DIAGNOSIS — E10.65 TYPE 1 DIABETES MELLITUS WITH HYPERGLYCEMIA: ICD-10-CM

## 2025-06-02 DIAGNOSIS — I10 ESSENTIAL HYPERTENSION: ICD-10-CM

## 2025-06-02 DIAGNOSIS — E10.21 DIABETIC NEPHROPATHY ASSOCIATED WITH TYPE 1 DIABETES MELLITUS: ICD-10-CM

## 2025-06-02 DIAGNOSIS — N18.2 TYPE 1 DIABETES MELLITUS WITH STAGE 2 CHRONIC KIDNEY DISEASE: ICD-10-CM

## 2025-06-02 LAB
ALBUMIN/CREAT UR: 24.5 UG/MG
ANION GAP (OHS): 8 MMOL/L (ref 8–16)
BUN SERPL-MCNC: 17 MG/DL (ref 6–20)
CALCIUM SERPL-MCNC: 9.2 MG/DL (ref 8.7–10.5)
CHLORIDE SERPL-SCNC: 107 MMOL/L (ref 95–110)
CO2 SERPL-SCNC: 23 MMOL/L (ref 23–29)
CREAT SERPL-MCNC: 0.7 MG/DL (ref 0.5–1.4)
CREAT UR-MCNC: 106 MG/DL (ref 15–325)
EAG (OHS): 206 MG/DL (ref 68–131)
GFR SERPLBLD CREATININE-BSD FMLA CKD-EPI: >60 ML/MIN/1.73/M2
GLUCOSE SERPL-MCNC: 92 MG/DL (ref 70–110)
GLUCOSE SERPL-MCNC: 93 MG/DL (ref 70–110)
HBA1C MFR BLD: 8.8 % (ref 4–5.6)
MICROALBUMIN UR-MCNC: 26 UG/ML (ref ?–5000)
POTASSIUM SERPL-SCNC: 4.3 MMOL/L (ref 3.5–5.1)
SODIUM SERPL-SCNC: 138 MMOL/L (ref 136–145)

## 2025-06-02 PROCEDURE — 82962 GLUCOSE BLOOD TEST: CPT | Mod: S$GLB,,, | Performed by: NURSE PRACTITIONER

## 2025-06-02 PROCEDURE — 3079F DIAST BP 80-89 MM HG: CPT | Mod: CPTII,S$GLB,, | Performed by: NURSE PRACTITIONER

## 2025-06-02 PROCEDURE — 83036 HEMOGLOBIN GLYCOSYLATED A1C: CPT

## 2025-06-02 PROCEDURE — 3074F SYST BP LT 130 MM HG: CPT | Mod: CPTII,S$GLB,, | Performed by: NURSE PRACTITIONER

## 2025-06-02 PROCEDURE — 3066F NEPHROPATHY DOC TX: CPT | Mod: CPTII,S$GLB,, | Performed by: NURSE PRACTITIONER

## 2025-06-02 PROCEDURE — 36415 COLL VENOUS BLD VENIPUNCTURE: CPT

## 2025-06-02 PROCEDURE — 3052F HG A1C>EQUAL 8.0%<EQUAL 9.0%: CPT | Mod: CPTII,S$GLB,, | Performed by: NURSE PRACTITIONER

## 2025-06-02 PROCEDURE — 1159F MED LIST DOCD IN RCRD: CPT | Mod: CPTII,S$GLB,, | Performed by: NURSE PRACTITIONER

## 2025-06-02 PROCEDURE — 82043 UR ALBUMIN QUANTITATIVE: CPT

## 2025-06-02 PROCEDURE — 3008F BODY MASS INDEX DOCD: CPT | Mod: CPTII,S$GLB,, | Performed by: NURSE PRACTITIONER

## 2025-06-02 PROCEDURE — 99214 OFFICE O/P EST MOD 30 MIN: CPT | Mod: S$GLB,,, | Performed by: NURSE PRACTITIONER

## 2025-06-02 PROCEDURE — 80048 BASIC METABOLIC PNL TOTAL CA: CPT

## 2025-06-02 PROCEDURE — G2211 COMPLEX E/M VISIT ADD ON: HCPCS | Mod: S$GLB,,, | Performed by: NURSE PRACTITIONER

## 2025-06-02 PROCEDURE — 95251 CONT GLUC MNTR ANALYSIS I&R: CPT | Mod: S$GLB,,, | Performed by: NURSE PRACTITIONER

## 2025-06-02 PROCEDURE — 3061F NEG MICROALBUMINURIA REV: CPT | Mod: CPTII,S$GLB,, | Performed by: NURSE PRACTITIONER

## 2025-06-02 PROCEDURE — 99999 PR PBB SHADOW E&M-EST. PATIENT-LVL IV: CPT | Mod: PBBFAC,,, | Performed by: NURSE PRACTITIONER

## 2025-06-02 RX ORDER — INSULIN LISPRO 100 [IU]/ML
INJECTION, SOLUTION INTRAVENOUS; SUBCUTANEOUS
Qty: 60 ML | Refills: 3 | Status: SHIPPED | OUTPATIENT
Start: 2025-06-02

## 2025-06-02 RX ORDER — BLOOD-GLUCOSE SENSOR
1 EACH MISCELLANEOUS
Qty: 3 EACH | Refills: 11 | Status: SHIPPED | OUTPATIENT
Start: 2025-06-02 | End: 2026-06-02

## 2025-06-02 RX ORDER — INSULIN PMP CART,AUT,G6/7,CNTR
1 EACH SUBCUTANEOUS
Qty: 30 EACH | Refills: 2 | Status: SHIPPED | OUTPATIENT
Start: 2025-06-02

## 2025-06-02 RX ORDER — GLUCAGON 3 MG/1
1 POWDER NASAL
Qty: 2 EACH | Refills: 11 | Status: SHIPPED | OUTPATIENT
Start: 2025-06-02

## 2025-06-18 ENCOUNTER — PATIENT MESSAGE (OUTPATIENT)
Dept: RESEARCH | Facility: HOSPITAL | Age: 25
End: 2025-06-18
Payer: COMMERCIAL

## 2025-07-31 ENCOUNTER — PATIENT OUTREACH (OUTPATIENT)
Dept: ADMINISTRATIVE | Facility: HOSPITAL | Age: 25
End: 2025-07-31
Payer: COMMERCIAL

## 2025-07-31 NOTE — PROGRESS NOTES
VBHM Score: 3     Cervical Cancer Screening  Foot Exam                 Attempted to contact the patient to discuss/schedule overdue annual exam with PCP and HM screenings, no answer, no voicemail. Campaign message sent to the patient on 7/21/25.

## 2025-08-12 ENCOUNTER — TELEPHONE (OUTPATIENT)
Dept: DIABETES | Facility: CLINIC | Age: 25
End: 2025-08-12
Payer: COMMERCIAL

## 2025-08-12 DIAGNOSIS — E10.65 TYPE 1 DIABETES MELLITUS WITH HYPERGLYCEMIA: ICD-10-CM

## 2025-08-29 ENCOUNTER — PATIENT MESSAGE (OUTPATIENT)
Dept: ADMINISTRATIVE | Facility: HOSPITAL | Age: 25
End: 2025-08-29
Payer: COMMERCIAL